# Patient Record
Sex: MALE | Race: WHITE | NOT HISPANIC OR LATINO | URBAN - NONMETROPOLITAN AREA
[De-identification: names, ages, dates, MRNs, and addresses within clinical notes are randomized per-mention and may not be internally consistent; named-entity substitution may affect disease eponyms.]

---

## 2018-01-23 ENCOUNTER — OFFICE VISIT (OUTPATIENT)
Dept: FAMILY MEDICINE CLINIC | Facility: CLINIC | Age: 43
End: 2018-01-23

## 2018-01-23 VITALS
SYSTOLIC BLOOD PRESSURE: 126 MMHG | DIASTOLIC BLOOD PRESSURE: 80 MMHG | HEIGHT: 67 IN | WEIGHT: 254.6 LBS | HEART RATE: 60 BPM | TEMPERATURE: 97.7 F | OXYGEN SATURATION: 98 % | BODY MASS INDEX: 39.96 KG/M2

## 2018-01-23 DIAGNOSIS — R06.2 WHEEZING: Primary | ICD-10-CM

## 2018-01-23 DIAGNOSIS — I10 BENIGN ESSENTIAL HYPERTENSION: ICD-10-CM

## 2018-01-23 DIAGNOSIS — R74.8 ELEVATED LIVER ENZYMES: ICD-10-CM

## 2018-01-23 PROCEDURE — 99203 OFFICE O/P NEW LOW 30 MIN: CPT | Performed by: FAMILY MEDICINE

## 2018-01-23 RX ORDER — ALBUTEROL SULFATE 90 UG/1
2 AEROSOL, METERED RESPIRATORY (INHALATION) EVERY 4 HOURS PRN
Qty: 1 INHALER | Refills: 0 | Status: SHIPPED | OUTPATIENT
Start: 2018-01-23 | End: 2019-01-24

## 2018-01-23 RX ORDER — CITALOPRAM 40 MG/1
40 TABLET ORAL
COMMUNITY
End: 2018-03-06

## 2018-01-23 RX ORDER — ALBUTEROL SULFATE 90 UG/1
2 AEROSOL, METERED RESPIRATORY (INHALATION) EVERY 4 HOURS PRN
Qty: 1 INHALER | Refills: 0 | Status: SHIPPED | OUTPATIENT
Start: 2018-01-23 | End: 2018-01-23 | Stop reason: SDUPTHER

## 2018-01-23 RX ORDER — CLONIDINE HYDROCHLORIDE 0.1 MG/1
0.1 TABLET ORAL
COMMUNITY
End: 2018-04-23 | Stop reason: SDUPTHER

## 2018-01-23 RX ORDER — LISINOPRIL 20 MG/1
20 TABLET ORAL
COMMUNITY
End: 2018-05-24 | Stop reason: SDUPTHER

## 2018-01-23 NOTE — PROGRESS NOTES
Subjective   Supa Kruse is a 42 y.o. male to discuss elevated liver enzymes and discuss his blood pressure and wheezing.       History of Present Illness     Benign essential hypertension-he has been treated for benign essential hypertension for approximately 5 years or so.  He is being treated with lisinopril 20 mg twice a day as well as clonidine 0.1 mg twice a day.  Overall his blood pressure tends to do well as long as he stays on his medicine.  No chest pain, shortness of breath, headache, lower extremity edema.  He says he needs some blood work for this.    Elevated liver enzymes-  he brings in some lab papers and I reviewed in front of him that were done in about July 2017.  I reviewed a CBC, CMP, thyroid, prostate level.  I noticed that the CMP is showing elevated liver enzymes and his AST at greater than 200 and ALT greater than 300.  He explains that he was drinking some during this time a few beers a day.  He said when this happen before he went into the thousands and his primary care told him to stop drinking altogether.  He notices a precipitous drop in his numbers.  At that point they chalked all this up to alcohol.  He was never tested for hepatitis.  He is unsure of his vaccine status for hepatitis.  He has not shown any scleral icterus, jaundice, abdominal pain that is chronic.  Also no abdominal distention.    Wheezing-lastly he says that he had the flu a few weeks ago and did not get anything for his treatment.  He is complaining that he is wheezing on the left side.  Endorsing occasional cough.  Would like something for the wheezing.    The following portions of the patient's history were reviewed and updated as appropriate: allergies, current medications, past family history, past medical history, past social history, past surgical history and problem list.    Review of Systems   Constitutional: Negative for activity change, appetite change, fatigue and fever.   HENT: Negative for ear pain,  facial swelling and sore throat.    Eyes: Negative for discharge and itching.   Respiratory: Positive for cough and wheezing. Negative for chest tightness and shortness of breath.    Cardiovascular: Negative for chest pain and palpitations.   Gastrointestinal: Negative for abdominal distention, abdominal pain, blood in stool, constipation, diarrhea, nausea, rectal pain and vomiting.   Musculoskeletal: Negative for arthralgias and back pain.   Neurological: Negative for weakness and numbness.   Psychiatric/Behavioral: Negative for decreased concentration and dysphoric mood. The patient is not nervous/anxious.        Objective   Physical Exam   Constitutional: He is oriented to person, place, and time. He appears well-developed and well-nourished. No distress.   HENT:   Mouth/Throat: Oropharynx is clear and moist. No oropharyngeal exudate.   Eyes: Conjunctivae are normal. Right eye exhibits no discharge. Left eye exhibits no discharge.   Neck: Normal range of motion. Neck supple.   Cardiovascular: Normal rate, regular rhythm, normal heart sounds and intact distal pulses.  Exam reveals no gallop and no friction rub.    No murmur heard.  Pulmonary/Chest: Effort normal and breath sounds normal. He has no wheezes. He has no rales.   Abdominal: Soft. Bowel sounds are normal. He exhibits no distension and no mass. There is no tenderness. There is no rebound and no guarding. No hernia.   Lymphadenopathy:     He has no cervical adenopathy.   Neurological: He is alert and oriented to person, place, and time.   Skin: Skin is warm and dry. No rash noted.   Psychiatric: He has a normal mood and affect. His behavior is normal.   Vitals reviewed.      Assessment/Plan   Supa was seen today for establish care.    Diagnoses and all orders for this visit:    Wheezing  -     albuterol (PROVENTIL HFA;VENTOLIN HFA) 108 (90 Base) MCG/ACT inhaler; Inhale 2 puffs Every 4 (Four) Hours As Needed for Wheezing.    Benign essential  hypertension  -     Comprehensive metabolic panel; Future  -     Comprehensive metabolic panel    Elevated liver enzymes  -     Comprehensive metabolic panel; Future  -     Hepatitis panel, acute; Future  -     Comprehensive metabolic panel  -     Hepatitis panel, acute    Other orders  -     Discontinue: albuterol (PROVENTIL HFA;VENTOLIN HFA) 108 (90 Base) MCG/ACT inhaler; Inhale 2 puffs Every 4 (Four) Hours As Needed for Wheezing.      We'll prescribe him some albuterol for the wheezing.  I'll get a CMP and hepatitis panel to hopefully shed light on the liver enzymes issue.

## 2018-01-23 NOTE — PATIENT INSTRUCTIONS
Liver Function Tests  Liver function tests are blood tests to see how well your liver is working. The proteins and enzymes measured in the test can alert your health care provider to inflammation, damage, or disease in your liver. It is common to have liver function tests:  · During annual physical exams.  · When you are taking certain medicines.  · If you have liver disease.  · If you drink a lot of alcohol.  · When you are not feeling well.  · When you have other conditions that may affect the liver.  Substances measured may include:  · Alanine transaminase (ALT). This is an enzyme in the liver.  · Aspartate transaminase (AST). This is an enzyme in the liver, heart, and muscles.  · Alkaline phosphatase (ALP). This is a protein in the liver, bile ducts, and bone. It is also in other body tissues.  · Total bilirubin. This is a yellow pigment in bile.  · Albumin. This is a protein in the liver.  · Prothrombin time and international normalized ratio (PT and INR). PT measures the time that it takes for your blood to clot. INR is a calculation of blood clotting time based upon your PT result. It is also calculated based on normal ranges defined by the laboratory that processed your lab test.  · Total protein. This measures two proteins, albumin and globulin, found in the blood.  How do I prepare for this test?  How you prepare will depend on which tests are being done and the reason why these tests are being done. You may need to:  · Avoid eating for 4-6 hours before the test or as directed by your health care provider.  · Stop taking certain medicines prior to your blood test as directed by your health care provider.  What do the results mean?  It is your responsibility to obtain your test results. Ask the lab or department performing the test when and how you will get your results. Contact your health care provider to discuss any questions you have about your results.  RANGE OF NORMAL VALUES   Ranges for normal values  may vary among different labs and hospitals. You should always check with your health care provider after having lab work or other tests done to discuss the meaning of your test results and whether your values are considered within normal limits.  The following are normal ranges for substances measured in liver function tests:  ALT  · Infant: may be twice as high as adult values.  · Child or adult: 4-36 international units/L at 37°C or 4-36 units/L (SI units).  · Elderly: may be slightly higher than adult values.  AST  ·  0-5 days old:  units/L.  · Child under 3 years old: 15-60 units/L.  · 3-6 years old: 15-50 units/L.  · 6-12 years old: 10-50 units/L.  · 12-18 years old: 10-40 units/L.  · Adult: 0-35 units/L or 0-0.58 microkatal/L (SI units).  · Elderly: slightly higher than adults.  ALP  · Child under 2 years old:  units/L.  · 2-8 years old:  units/L.  · 9-15 years old:  units/L.  · 16-21 years old:  units/L.  ·   · Elderly: slightly higher than adult.  Total bilirubin  · [place]: 1.0-12.0 mg/dL or 17.1-205 micromoles/L (SI units).  · Adult, elderly, or child: 0.3-1.0 mg/dL or 5.1-17 micromoles/L.  Albumin  · Premature infant: 3.0-4.2 g/dL.  · [place]: 3.5-5.4 g/dL.  · Infant: 4.4-5.4 g/dL.  · Child: 4.0-5.9 g/dL.  · Adult or elderly: 3.5-5.0 g/dL or 35-50 g/L (SI units).  PT  · 11.0-12.5 seconds; 85%-100%.  INR  · 0.8-1.1.  Total protein  · Premature infant: 4.2-7.6 g/dL.  · [place]: 4.6-7.4 g/dL.  · Infant: 6.0-6.7 g/dL.  · Child: 6.2-8.0 g/dL.  · Adult or elderly: 6.4-8.3 g/dL or 64-83 g/L (SI units).  MEANING OF RESULTS OUTSIDE NORMAL VALUE RANGES   Sometimes test results can be abnormal due to other factors, such as medicines, exercise, or pregnancy. Follow up with your health care provider if you have any questions about test results outside the normal value ranges.  ALT  · Levels above the normal range, along with other test results, may indicate liver  disease.  AST  · Levels above the normal range, along with other test results, may indicate liver disease. Sometimes levels also increase after burns, surgery, heart attack, muscle damage, or seizure.  ALP  · Levels above the normal range, along with other test results, may indicate biliary obstruction, diseases of the liver, bone disease, thyroid disease, tumors, fractures, leukemia or lymphoma, or several other conditions. People with blood type O or B may show higher levels after a fatty meal.  · Levels below the normal range, along with other test results, may indicate bone and teeth conditions, malnutrition, protein deficiency, or Marcio disease.  Total bilirubin  · Levels above the normal range, along with other test results, may indicate problems with the liver, gallbladder, or bile ducts.  Albumin  · Levels above the normal range, along with other test results, may indicate dehydration. They may also be caused by a diet that is high in protein. Sometimes, the band placed around the upper arm during the process of drawing blood can cause the level of this protein in your blood to rise and give you a result above the normal range.  · Levels below the normal range, along with other tests results, may indicate kidney disease, liver disease, or malabsorption of nutrients.  PT and INR  · Levels above the normal range mean your blood is clotting slower than normal. This may be due to blood disorders, liver disorders, or low levels of vitamin K.  Total protein  · Levels above the normal range, along with other test results, may be due to infection or other diseases.  · Levels below the normal range, along with other test results, may be due to an immune system disorder, bleeding, burns, kidney disorder, liver disease, trouble absorbing or getting enough nutrients, or other conditions that affect the intestines.  Talk with your health care provider to discuss your results, treatment options, and if necessary, the  need for more tests. Talk with your health care provider if you have any questions about your results.  This information is not intended to replace advice given to you by your health care provider. Make sure you discuss any questions you have with your health care provider.  Document Released: 01/20/2006 Document Revised: 08/23/2017 Document Reviewed: 04/23/2015  [employer] Interactive Patient Education © 2017 [employer]

## 2018-01-24 LAB
ALBUMIN SERPL-MCNC: 5 G/DL (ref 3.5–5.5)
ALBUMIN/GLOB SERPL: 1.9 {RATIO} (ref 1.2–2.2)
ALP SERPL-CCNC: 106 IU/L (ref 39–117)
ALT SERPL-CCNC: 14 IU/L (ref 0–44)
AST SERPL-CCNC: 14 IU/L (ref 0–40)
BILIRUB SERPL-MCNC: 0.8 MG/DL (ref 0–1.2)
BUN SERPL-MCNC: 15 MG/DL (ref 6–24)
BUN/CREAT SERPL: 17 (ref 9–20)
CALCIUM SERPL-MCNC: 10.2 MG/DL (ref 8.7–10.2)
CHLORIDE SERPL-SCNC: 97 MMOL/L (ref 96–106)
CO2 SERPL-SCNC: 22 MMOL/L (ref 18–29)
CREAT SERPL-MCNC: 0.89 MG/DL (ref 0.76–1.27)
GLOBULIN SER CALC-MCNC: 2.6 G/DL (ref 1.5–4.5)
GLUCOSE SERPL-MCNC: 76 MG/DL (ref 65–99)
HAV IGM SERPL QL IA: NEGATIVE
HBV CORE IGM SERPL QL IA: NEGATIVE
HBV SURFACE AG SERPL QL IA: NEGATIVE
HCV AB S/CO SERPL IA: 0.2 S/CO RATIO (ref 0–0.9)
POTASSIUM SERPL-SCNC: 4.2 MMOL/L (ref 3.5–5.2)
PROT SERPL-MCNC: 7.6 G/DL (ref 6–8.5)
SODIUM SERPL-SCNC: 141 MMOL/L (ref 134–144)

## 2018-01-29 ENCOUNTER — TELEPHONE (OUTPATIENT)
Dept: FAMILY MEDICINE CLINIC | Facility: CLINIC | Age: 43
End: 2018-01-29

## 2018-03-06 ENCOUNTER — OFFICE VISIT (OUTPATIENT)
Dept: FAMILY MEDICINE CLINIC | Facility: CLINIC | Age: 43
End: 2018-03-06

## 2018-03-06 VITALS
HEART RATE: 68 BPM | OXYGEN SATURATION: 98 % | HEIGHT: 67 IN | BODY MASS INDEX: 39.24 KG/M2 | RESPIRATION RATE: 16 BRPM | WEIGHT: 250 LBS | SYSTOLIC BLOOD PRESSURE: 130 MMHG | TEMPERATURE: 98.7 F | DIASTOLIC BLOOD PRESSURE: 72 MMHG

## 2018-03-06 DIAGNOSIS — B35.3 TINEA PEDIS OF BOTH FEET: Primary | ICD-10-CM

## 2018-03-06 PROCEDURE — 99213 OFFICE O/P EST LOW 20 MIN: CPT | Performed by: FAMILY MEDICINE

## 2018-03-06 RX ORDER — KETOCONAZOLE 20 MG/G
CREAM TOPICAL EVERY 12 HOURS SCHEDULED
Qty: 30 G | Refills: 1 | Status: SHIPPED | OUTPATIENT
Start: 2018-03-06 | End: 2018-04-11 | Stop reason: SDUPTHER

## 2018-03-06 NOTE — PROGRESS NOTES
Subjective   Supa Kruse is a 42 y.o. male. Presents today with possible athletes feet x 4 months.    History of Present Illness     Athlete's foot - Recurrent since January through March.  Tried all OTC products he can think of such as powders, ointments, and even the expensive stuff.  Changed shoes and treated shoes.  Describes as lateral toes with burning and itching.      The following portions of the patient's history were reviewed and updated as appropriate: allergies, current medications, past family history, past medical history, past social history, past surgical history and problem list.    Review of Systems   Constitutional: Negative for activity change and appetite change.   Skin: Positive for rash. Negative for color change, pallor and wound.        Itchy feet  Burning feet       Objective   Physical Exam   Constitutional: He appears well-developed and well-nourished. No distress.   Cardiovascular: Normal rate, regular rhythm and normal heart sounds.    Pulmonary/Chest: Effort normal and breath sounds normal. No respiratory distress.   Skin: Skin is warm and dry. He is not diaphoretic.   Mildly erythematous crusty rash bilateral feet       Assessment/Plan   Supa was seen today for recurrent skin infections.    Diagnoses and all orders for this visit:    Tinea pedis of both feet  -     ketoconazole (NIZORAL) 2 % cream; Apply  topically Every 12 (Twelve) Hours.      Also added aluminum acetate soaks twice daily for 7 days.  He is to do the ketoconazole for at least 2-6 weeks so he should do the cream until the rash is gone and then for 2 weeks afterwards.  If this does not improve his symptoms, we'll consider referral to podiatry.

## 2018-03-06 NOTE — PATIENT INSTRUCTIONS
Athlete's Foot  Athlete's foot (tinea pedis) is a fungal infection of the skin on the feet. It often occurs on the skin that is between or underneath the toes. It can also occur on the soles of the feet. The infection can spread from person to person (is contagious).  Follow these instructions at home:  · Apply or take over-the-counter and prescription medicines only as told by your doctor.  · Keep all follow-up visits as told by your doctor. This is important.  · Do not scratch your feet.  · Keep your feet dry:  ¨ Wear cotton or wool socks. Change your socks every day or if they become wet.  ¨ Wear shoes that allow air to move around, such as sandals or canvas tennis shoes.  · Wash and dry your feet:  ¨ Every day or as told by your doctor.  ¨ After exercising.  ¨ Including the area between your toes.  · Wear sandals in wet areas, such as locker rooms and shared showers.  · Do not share any of these items:  ¨ Towels.  ¨ Nail clippers.  ¨ Other personal items that touch your feet.  · If you have diabetes, keep your blood sugar under control.  Contact a doctor if:  · You have a fever.  · You have swelling, soreness, warmth, or redness in your foot.  · You are not getting better with treatment.  · Your symptoms get worse.  · You have new symptoms.  This information is not intended to replace advice given to you by your health care provider. Make sure you discuss any questions you have with your health care provider.  Document Released: 06/05/2009 Document Revised: 05/25/2017 Document Reviewed: 06/20/2016  [employer] Interactive Patient Education © 2017 [employer]

## 2018-04-11 DIAGNOSIS — B35.3 TINEA PEDIS OF BOTH FEET: ICD-10-CM

## 2018-04-11 RX ORDER — KETOCONAZOLE 20 MG/G
CREAM TOPICAL
Qty: 30 G | Refills: 0 | Status: SHIPPED | OUTPATIENT
Start: 2018-04-11 | End: 2018-05-14 | Stop reason: SDUPTHER

## 2018-04-23 RX ORDER — CLONIDINE HYDROCHLORIDE 0.1 MG/1
TABLET ORAL
Qty: 60 TABLET | Refills: 0 | Status: SHIPPED | OUTPATIENT
Start: 2018-04-23 | End: 2018-05-24 | Stop reason: SDUPTHER

## 2018-05-11 DIAGNOSIS — B35.3 TINEA PEDIS OF BOTH FEET: ICD-10-CM

## 2018-05-14 RX ORDER — KETOCONAZOLE 20 MG/G
CREAM TOPICAL
Qty: 30 G | Refills: 0 | Status: SHIPPED | OUTPATIENT
Start: 2018-05-14 | End: 2018-08-29 | Stop reason: SDUPTHER

## 2018-05-24 RX ORDER — LISINOPRIL 20 MG/1
TABLET ORAL
Qty: 60 TABLET | Refills: 5 | Status: SHIPPED | OUTPATIENT
Start: 2018-05-24 | End: 2018-12-26 | Stop reason: SDUPTHER

## 2018-05-24 RX ORDER — CLONIDINE HYDROCHLORIDE 0.1 MG/1
TABLET ORAL
Qty: 60 TABLET | Refills: 5 | Status: SHIPPED | OUTPATIENT
Start: 2018-05-24 | End: 2018-12-26 | Stop reason: SDUPTHER

## 2018-08-29 DIAGNOSIS — B35.3 TINEA PEDIS OF BOTH FEET: ICD-10-CM

## 2018-08-29 RX ORDER — KETOCONAZOLE 20 MG/G
CREAM TOPICAL
Qty: 30 G | Refills: 5 | Status: SHIPPED | OUTPATIENT
Start: 2018-08-29 | End: 2019-01-24

## 2018-12-20 ENCOUNTER — OFFICE VISIT (OUTPATIENT)
Dept: FAMILY MEDICINE CLINIC | Facility: CLINIC | Age: 43
End: 2018-12-20

## 2018-12-20 VITALS
RESPIRATION RATE: 16 BRPM | DIASTOLIC BLOOD PRESSURE: 72 MMHG | OXYGEN SATURATION: 99 % | WEIGHT: 229.6 LBS | BODY MASS INDEX: 36.03 KG/M2 | HEART RATE: 80 BPM | TEMPERATURE: 97.8 F | SYSTOLIC BLOOD PRESSURE: 130 MMHG | HEIGHT: 67 IN

## 2018-12-20 DIAGNOSIS — I10 BENIGN ESSENTIAL HYPERTENSION: Primary | ICD-10-CM

## 2018-12-20 DIAGNOSIS — Z13.220 SCREENING FOR LIPOID DISORDERS: ICD-10-CM

## 2018-12-20 DIAGNOSIS — Z79.899 HIGH RISK MEDICATION USE: ICD-10-CM

## 2018-12-20 DIAGNOSIS — Z23 NEED FOR INFLUENZA VACCINATION: ICD-10-CM

## 2018-12-20 LAB
ALBUMIN SERPL-MCNC: 4.7 G/DL (ref 3.5–5.2)
ALBUMIN/GLOB SERPL: 1.8 G/DL
ALP SERPL-CCNC: 108 U/L (ref 39–117)
ALT SERPL-CCNC: 13 U/L (ref 1–41)
AST SERPL-CCNC: 16 U/L (ref 1–40)
BILIRUB SERPL-MCNC: 0.5 MG/DL (ref 0.1–1.2)
BUN SERPL-MCNC: 16 MG/DL (ref 6–20)
BUN/CREAT SERPL: 20.8 (ref 7–25)
CALCIUM SERPL-MCNC: 10.1 MG/DL (ref 8.6–10.5)
CHLORIDE SERPL-SCNC: 99 MMOL/L (ref 98–107)
CHOLEST SERPL-MCNC: 177 MG/DL (ref 0–200)
CO2 SERPL-SCNC: 26.6 MMOL/L (ref 22–29)
CREAT SERPL-MCNC: 0.77 MG/DL (ref 0.76–1.27)
GLOBULIN SER CALC-MCNC: 2.6 GM/DL
GLUCOSE SERPL-MCNC: 96 MG/DL (ref 65–99)
HDLC SERPL-MCNC: 34 MG/DL (ref 40–60)
LDLC SERPL CALC-MCNC: 122 MG/DL (ref 0–100)
POTASSIUM SERPL-SCNC: 4.3 MMOL/L (ref 3.5–5.2)
PROT SERPL-MCNC: 7.3 G/DL (ref 6–8.5)
SODIUM SERPL-SCNC: 137 MMOL/L (ref 136–145)
TRIGL SERPL-MCNC: 103 MG/DL (ref 0–150)
VLDLC SERPL CALC-MCNC: 20.6 MG/DL (ref 5–40)

## 2018-12-20 PROCEDURE — 90471 IMMUNIZATION ADMIN: CPT | Performed by: FAMILY MEDICINE

## 2018-12-20 PROCEDURE — 99213 OFFICE O/P EST LOW 20 MIN: CPT | Performed by: FAMILY MEDICINE

## 2018-12-20 PROCEDURE — 90674 CCIIV4 VAC NO PRSV 0.5 ML IM: CPT | Performed by: FAMILY MEDICINE

## 2018-12-20 NOTE — PATIENT INSTRUCTIONS
Hypertension  Hypertension is another name for high blood pressure. High blood pressure forces your heart to work harder to pump blood. This can cause problems over time.  There are two numbers in a blood pressure reading. There is a top number (systolic) over a bottom number (diastolic). It is best to have a blood pressure below 120/80. Healthy choices can help lower your blood pressure. You may need medicine to help lower your blood pressure if:  · Your blood pressure cannot be lowered with healthy choices.  · Your blood pressure is higher than 130/80.    Follow these instructions at home:  Eating and drinking  · If directed, follow the DASH eating plan. This diet includes:  ? Filling half of your plate at each meal with fruits and vegetables.  ? Filling one quarter of your plate at each meal with whole grains. Whole grains include whole wheat pasta, brown rice, and whole grain bread.  ? Eating or drinking low-fat dairy products, such as skim milk or low-fat yogurt.  ? Filling one quarter of your plate at each meal with low-fat (lean) proteins. Low-fat proteins include fish, skinless chicken, eggs, beans, and tofu.  ? Avoiding fatty meat, cured and processed meat, or chicken with skin.  ? Avoiding premade or processed food.  · Eat less than 1,500 mg of salt (sodium) a day.  · Limit alcohol use to no more than 1 drink a day for nonpregnant women and 2 drinks a day for men. One drink equals 12 oz of beer, 5 oz of wine, or 1½ oz of hard liquor.  Lifestyle  · Work with your doctor to stay at a healthy weight or to lose weight. Ask your doctor what the best weight is for you.  · Get at least 30 minutes of exercise that causes your heart to beat faster (aerobic exercise) most days of the week. This may include walking, swimming, or biking.  · Get at least 30 minutes of exercise that strengthens your muscles (resistance exercise) at least 3 days a week. This may include lifting weights or pilates.  · Do not use any  products that contain nicotine or tobacco. This includes cigarettes and e-cigarettes. If you need help quitting, ask your doctor.  · Check your blood pressure at home as told by your doctor.  · Keep all follow-up visits as told by your doctor. This is important.  Medicines  · Take over-the-counter and prescription medicines only as told by your doctor. Follow directions carefully.  · Do not skip doses of blood pressure medicine. The medicine does not work as well if you skip doses. Skipping doses also puts you at risk for problems.  · Ask your doctor about side effects or reactions to medicines that you should watch for.  Contact a doctor if:  · You think you are having a reaction to the medicine you are taking.  · You have headaches that keep coming back (recurring).  · You feel dizzy.  · You have swelling in your ankles.  · You have trouble with your vision.  Get help right away if:  · You get a very bad headache.  · You start to feel confused.  · You feel weak or numb.  · You feel faint.  · You get very bad pain in your:  ? Chest.  ? Belly (abdomen).  · You throw up (vomit) more than once.  · You have trouble breathing.  Summary  · Hypertension is another name for high blood pressure.  · Making healthy choices can help lower blood pressure. If your blood pressure cannot be controlled with healthy choices, you may need to take medicine.  This information is not intended to replace advice given to you by your health care provider. Make sure you discuss any questions you have with your health care provider.  Document Released: 06/05/2009 Document Revised: 11/15/2017 Document Reviewed: 11/15/2017  [employer] Interactive Patient Education © 2018 [employer]

## 2018-12-20 NOTE — PROGRESS NOTES
Subjective   Supa Kruse is a 42 y.o. male. Presents today for medication management for hypertension.     History of Present Illness     Hypertension - Overall it has been doing very well.  He has been working out multiple times a week and changed much of the foods he was eating.  He is now trying to take care of himself and no longer drinking alcohol.  Taking the lisinopril 20mg BID and clonidine 0.1mg BID as prescribed.  He would like to slowly come off of BP meds as tolerated.  No chest pain, SOB, headache, or lower extremity edema.    The following portions of the patient's history were reviewed and updated as appropriate: allergies, current medications, past family history, past medical history, past social history, past surgical history and problem list.    Review of Systems   Constitutional: Negative for activity change, appetite change, fatigue and fever.   Respiratory: Negative for chest tightness and shortness of breath.    Cardiovascular: Negative for chest pain and palpitations.       Objective   Physical Exam   Constitutional: He is oriented to person, place, and time. He appears well-developed and well-nourished. No distress.   HENT:   Mouth/Throat: Oropharynx is clear and moist. No oropharyngeal exudate.   Eyes: Conjunctivae are normal. Right eye exhibits no discharge. Left eye exhibits no discharge.   Neck: Normal range of motion. Neck supple.   Cardiovascular: Normal rate, regular rhythm and normal heart sounds. Exam reveals no gallop and no friction rub.   No murmur heard.  Pulmonary/Chest: Effort normal and breath sounds normal. He has no wheezes. He has no rales.   Abdominal: Soft. Bowel sounds are normal. He exhibits no distension. There is no tenderness.   Musculoskeletal: He exhibits no edema or deformity.   Lymphadenopathy:     He has no cervical adenopathy.   Neurological: He is alert and oriented to person, place, and time.   Skin: Skin is warm and dry. No rash noted.   Psychiatric: He  has a normal mood and affect. His behavior is normal.   Nursing note and vitals reviewed.      Assessment/Plan   Supa was seen today for med management.    Diagnoses and all orders for this visit:    Benign essential hypertension  -     Comprehensive Metabolic Panel    Need for influenza vaccination  -     Flucelvax Quad=>4Years ([id number])    Screening for lipoid disorders  -     Lipid panel    High risk medication use  -     Comprehensive Metabolic Panel  -     Lipid panel      Will get tests as above.  Will try to taper lisinopril over a few weeks and have patient call in blood pressures that are above 140/90.  If going well will consider tapering clonidine.

## 2018-12-26 RX ORDER — LISINOPRIL 20 MG/1
TABLET ORAL
Qty: 60 TABLET | Refills: 2 | Status: SHIPPED | OUTPATIENT
Start: 2018-12-26 | End: 2019-04-02 | Stop reason: SDUPTHER

## 2018-12-26 RX ORDER — CLONIDINE HYDROCHLORIDE 0.1 MG/1
TABLET ORAL
Qty: 60 TABLET | Refills: 2 | Status: SHIPPED | OUTPATIENT
Start: 2018-12-26 | End: 2019-04-02 | Stop reason: SDUPTHER

## 2019-01-24 ENCOUNTER — OFFICE VISIT (OUTPATIENT)
Dept: FAMILY MEDICINE CLINIC | Facility: CLINIC | Age: 44
End: 2019-01-24

## 2019-01-24 VITALS
DIASTOLIC BLOOD PRESSURE: 64 MMHG | BODY MASS INDEX: 36.32 KG/M2 | HEART RATE: 56 BPM | OXYGEN SATURATION: 98 % | WEIGHT: 231.4 LBS | SYSTOLIC BLOOD PRESSURE: 124 MMHG | TEMPERATURE: 97.9 F | RESPIRATION RATE: 16 BRPM | HEIGHT: 67 IN

## 2019-01-24 DIAGNOSIS — F40.9 INSOMNIA DUE TO ANXIETY AND FEAR: Primary | ICD-10-CM

## 2019-01-24 DIAGNOSIS — F51.05 INSOMNIA DUE TO ANXIETY AND FEAR: Primary | ICD-10-CM

## 2019-01-24 DIAGNOSIS — F43.21 COMPLICATED BEREAVEMENT: ICD-10-CM

## 2019-01-24 PROCEDURE — 99213 OFFICE O/P EST LOW 20 MIN: CPT | Performed by: FAMILY MEDICINE

## 2019-01-24 RX ORDER — TRAZODONE HYDROCHLORIDE 50 MG/1
TABLET ORAL
Qty: 45 TABLET | Refills: 1 | Status: SHIPPED | OUTPATIENT
Start: 2019-01-24 | End: 2019-03-25

## 2019-01-24 NOTE — PATIENT INSTRUCTIONS
Generalized Anxiety Disorder, Adult  Generalized anxiety disorder (MADELYN) is a mental health disorder. People with this condition constantly worry about everyday events. Unlike normal anxiety, worry related to MADELYN is not triggered by a specific event. These worries also do not fade or get better with time. MADELYN interferes with life functions, including relationships, work, and school.  MADELYN can vary from mild to severe. People with severe MADELYN can have intense waves of anxiety with physical symptoms (panic attacks).  What are the causes?  The exact cause of MADELYN is not known.  What increases the risk?  This condition is more likely to develop in:  · Women.  · People who have a family history of anxiety disorders.  · People who are very shy.  · People who experience very stressful life events, such as the death of a loved one.  · People who have a very stressful family environment.    What are the signs or symptoms?  People with MADLEYN often worry excessively about many things in their lives, such as their health and family. They may also be overly concerned about:  · Doing well at work.  · Being on time.  · Natural disasters.  · Friendships.    Physical symptoms of MADELYN include:  · Fatigue.  · Muscle tension or having muscle twitches.  · Trembling or feeling shaky.  · Being easily startled.  · Feeling like your heart is pounding or racing.  · Feeling out of breath or like you cannot take a deep breath.  · Having trouble falling asleep or staying asleep.  · Sweating.  · Nausea, diarrhea, or irritable bowel syndrome (IBS).  · Headaches.  · Trouble concentrating or remembering facts.  · Restlessness.  · Irritability.    How is this diagnosed?  Your health care provider can diagnose MADELYN based on your symptoms and medical history. You will also have a physical exam. The health care provider will ask specific questions about your symptoms, including how severe they are, when they started, and if they come and go. Your health care  provider may ask you about your use of alcohol or drugs, including prescription medicines. Your health care provider may refer you to a mental health specialist for further evaluation.  Your health care provider will do a thorough examination and may perform additional tests to rule out other possible causes of your symptoms.  To be diagnosed with MADELYN, a person must have anxiety that:  · Is out of his or her control.  · Affects several different aspects of his or her life, such as work and relationships.  · Causes distress that makes him or her unable to take part in normal activities.  · Includes at least three physical symptoms of MADELYN, such as restlessness, fatigue, trouble concentrating, irritability, muscle tension, or sleep problems.    Before your health care provider can confirm a diagnosis of MADELYN, these symptoms must be present more days than they are not, and they must last for six months or longer.  How is this treated?  The following therapies are usually used to treat MADELYN:  · Medicine. Antidepressant medicine is usually prescribed for long-term daily control. Antianxiety medicines may be added in severe cases, especially when panic attacks occur.  · Talk therapy (psychotherapy). Certain types of talk therapy can be helpful in treating MADELYN by providing support, education, and guidance. Options include:  ? Cognitive behavioral therapy (CBT). People learn coping skills and techniques to ease their anxiety. They learn to identify unrealistic or negative thoughts and behaviors and to replace them with positive ones.  ? Acceptance and commitment therapy (ACT). This treatment teaches people how to be mindful as a way to cope with unwanted thoughts and feelings.  ? Biofeedback. This process trains you to manage your body's response (physiological response) through breathing techniques and relaxation methods. You will work with a therapist while machines are used to monitor your physical symptoms.  · Stress  management techniques. These include yoga, meditation, and exercise.    A mental health specialist can help determine which treatment is best for you. Some people see improvement with one type of therapy. However, other people require a combination of therapies.  Follow these instructions at home:  · Take over-the-counter and prescription medicines only as told by your health care provider.  · Try to maintain a normal routine.  · Try to anticipate stressful situations and allow extra time to manage them.  · Practice any stress management or self-calming techniques as taught by your health care provider.  · Do not punish yourself for setbacks or for not making progress.  · Try to recognize your accomplishments, even if they are small.  · Keep all follow-up visits as told by your health care provider. This is important.  Contact a health care provider if:  · Your symptoms do not get better.  · Your symptoms get worse.  · You have signs of depression, such as:  ? A persistently sad, cranky, or irritable mood.  ? Loss of enjoyment in activities that used to bring you neris.  ? Change in weight or eating.  ? Changes in sleeping habits.  ? Avoiding friends or family members.  ? Loss of energy for normal tasks.  ? Feelings of guilt or worthlessness.  Get help right away if:  · You have serious thoughts about hurting yourself or others.  If you ever feel like you may hurt yourself or others, or have thoughts about taking your own life, get help right away. You can go to your nearest emergency department or call:  · Your local emergency services (911 in the [place]  · A suicide crisis helpline, such as the National Suicide Prevention Lifeline at 1-462.275.8873. This is open 24 hours a day.    Summary  · Generalized anxiety disorder (MADELYN) is a mental health disorder that involves worry that is not triggered by a specific event.  · People with MADELYN often worry excessively about many things in their lives, such as their health and  family.  · MADELYN may cause physical symptoms such as restlessness, trouble concentrating, sleep problems, frequent sweating, nausea, diarrhea, headaches, and trembling or muscle twitching.  · A mental health specialist can help determine which treatment is best for you. Some people see improvement with one type of therapy. However, other people require a combination of therapies.  This information is not intended to replace advice given to you by your health care provider. Make sure you discuss any questions you have with your health care provider.  Document Released: 04/14/2014 Document Revised: 11/07/2017 Document Reviewed: 11/07/2017  [employer] Interactive Patient Education © 2018 [employer]

## 2019-01-24 NOTE — PROGRESS NOTES
Subjective   Supa Kruse is a 43 y.o. male. Patient is being evaluated today for anxiety and stress.   History of Present Illness     {Common H&P Review Areas:19588}    Review of Systems   Psychiatric/Behavioral: The patient is nervous/anxious.         Stress.    All other systems reviewed and are negative.      Objective   Physical Exam      Assessment/Plan   {Assess/PlanSmartLinks:39647}

## 2019-01-24 NOTE — PROGRESS NOTES
Subjective   Supa Kruse is a 43 y.o. male present today to discuss anxiety and stress.     History of Present Illness     Anxiety - He has been reflecting recently on people in his life in that he is fearing losing them to either death or moving on.  He admits that this is irrational.  His wife  9 years ago and hx of SSRI use which he did not like.  Hx of alcohol abuse but not using it much anymore.  He is also concerned that at some point his son is going to move out of the house and he will not have any body left to care for.  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    The following portions of the patient's history were reviewed and updated as appropriate: allergies, current medications, past family history, past medical history, past social history, past surgical history and problem list.    Review of Systems   Psychiatric/Behavioral: The patient is nervous/anxious.         Stress.        Objective   Physical Exam   Constitutional: He appears well-developed and well-nourished. No distress.   Skin: He is not diaphoretic.   Psychiatric: His speech is normal and behavior is normal. Judgment and thought content normal. His mood appears anxious. Cognition and memory are normal. He is attentive.   Nursing note and vitals reviewed.      Assessment/Plan   Supa was seen today for anxiety and stress.    Diagnoses and all orders for this visit:    Insomnia due to anxiety and fear  -     traZODone (DESYREL) 50 MG tablet; Take 1 tablet at bedtime as needed for sleep.  May repeat 1 dose in 2 hours if still awake  -     Ambulatory Referral to Psychiatry    Complicated bereavement  -     Ambulatory Referral to Psychiatry      Plan as above.  Will start trazodone tonight for sleep.

## 2019-01-30 ENCOUNTER — TELEPHONE (OUTPATIENT)
Dept: FAMILY MEDICINE CLINIC | Facility: CLINIC | Age: 44
End: 2019-01-30

## 2019-01-30 NOTE — TELEPHONE ENCOUNTER
Patient called stating he has been having worsening anxiety/depression since his last office visit on 1/24/2019. He stated the trazodone that was prescribed does help him get to sleep but he only stays asleep for 2-3 hours at a time. He thinks he really needs to be put on an antidepressant. Do you want him to come back in for another office visit to discuss this?     Also, he mentioned to me about worsening tinea pedis of his feet and that he is out of his medications. I told him to have the pharmacy send us a refill request on his medications.     Please advise, thanks.

## 2019-02-04 ENCOUNTER — OFFICE VISIT (OUTPATIENT)
Dept: FAMILY MEDICINE CLINIC | Facility: CLINIC | Age: 44
End: 2019-02-04

## 2019-02-04 VITALS
BODY MASS INDEX: 35.94 KG/M2 | HEART RATE: 80 BPM | RESPIRATION RATE: 16 BRPM | HEIGHT: 67 IN | DIASTOLIC BLOOD PRESSURE: 60 MMHG | TEMPERATURE: 97.8 F | WEIGHT: 229 LBS | OXYGEN SATURATION: 99 % | SYSTOLIC BLOOD PRESSURE: 110 MMHG

## 2019-02-04 DIAGNOSIS — F39 MOOD DISORDER (HCC): Primary | ICD-10-CM

## 2019-02-04 PROCEDURE — 99214 OFFICE O/P EST MOD 30 MIN: CPT | Performed by: FAMILY MEDICINE

## 2019-02-04 RX ORDER — DIVALPROEX SODIUM 500 MG/1
500 TABLET, EXTENDED RELEASE ORAL DAILY
Qty: 30 TABLET | Refills: 1 | Status: SHIPPED | OUTPATIENT
Start: 2019-02-04 | End: 2019-03-06

## 2019-02-04 NOTE — PROGRESS NOTES
Subjective   Supa Kruse is a 43 y.o. male. Presents today for follow up on anxiety/depression.    History of Present Illness     Patient was seen fairly recently for anxiety and depression and in the end he did not want take any antidepressants and settled on some trazodone for insomnia.  Since then his anxiety and depression have spiraled and he has also gotten worse sleep.  He says he waking up multiple times at night.  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  His family history includes schizophrenia as well as bipolar disorder.  He himself has a history of medicating with alcohol and benzos.  He says he is here today because his family continuously worries about him and thinks he should be on something every day.  He talks about feeling like he doesn't want to be around anymore but he has no active suicidal ideation as he cites that his son and father basically keep him alive.    The following portions of the patient's history were reviewed and updated as appropriate: allergies, current medications, past family history, past medical history, past social history, past surgical history and problem list.    Review of Systems   Psychiatric/Behavioral: Positive for decreased concentration, dysphoric mood and sleep disturbance. Negative for agitation, behavioral problems, confusion, hallucinations and self-injury. The patient is nervous/anxious. The patient is not hyperactive.        Objective   Physical Exam   Constitutional: He appears well-developed and well-nourished. No distress.   Skin: He is not diaphoretic.   Psychiatric: His behavior is normal. Judgment and thought content normal. His mood appears anxious. His speech is rapid and/or pressured. Cognition and memory are normal. He exhibits a depressed mood. He is inattentive.   Nursing note and vitals reviewed.      Assessment/Plan   Supa was seen today for  follow-up.    Diagnoses and all orders for this visit:    Mood disorder (CMS/[facility])  -     NeuroPsych Testing; Future  -     divalproex (DEPAKOTE ER) 500 MG 24 hr tablet; Take 1 tablet by mouth Daily.     I'm concerned with some of the behavior he has been exhibiting lately.  He is showing some signs of a possible bipolar disorder type II diagnosis which would change the treatment design drastically.  I am going to get him a neuropsych test as he does not have the time or funds for a partial hospitalization program such as that [facility].  While already on the test come back am going to get him started on Depakote.  If it turns out that he does not support a diagnosis of bipolar disorder type II Depakote will be fairly easy to get him switched onto something more appropriate.  But in the meantime Depakote should prevent his mood from being too unstable.  As always he was given suicide prevention tips and asked to call us or 911 if he feels active suicidal thoughts coming.    Spent >30 minutes in the room with > 50 % of the time doing face-toface counseling as above and about the diagnosis of bipolar disorder.

## 2019-02-04 NOTE — PATIENT INSTRUCTIONS
Neuropsychological Examination  A neuropsychological examination is a series of tests related to thinking and behavior. It is done to see how the brain works. The exam may be done on people who have had a brain injury or trauma or who have a disease or disorder that affects the brain. The results will help your health care provider diagnose any problems and put together a helpful treatment or assistance plan.  Tell a health care provider about:  · Any allergies you have.  · Your sports and activities.  · Recent injuries or surgery.  · All medicines you are taking, including vitamins, herbs, eye drops, creams, and over-the-counter medicines.  · Any medical conditions you have.  · Any other issues or behaviors that might affect your ability to take the exam.  What happens before the procedure?  · Ask your health care provider about how medicines you are taking may affect the exam results.  · Prepare a list of current medicines to bring to the exam.  · Ask a family member or friend to come to the test if you have trouble remembering family history or any other details.  · Prepare any records of previous neurological testing, including CT or MRI scans, to bring to the exam.  · Get a good night’s rest before the exam.  · Do not drink alcohol 24 hours before the exam.  · Let the examiner know about any other issues or behaviors that might affect your ability to take the exam.  What happens during the procedure?  The test is usually performed at an outpatient clinic. You will sit at a table for most of the exam. It can also be done bedside in a hospital setting, if necessary. During the exam, you will answer questions, make choices, read, write, and complete simple physical tasks. You might also use a computer-guided lesson. There is no pain, needles, or electrodes. A trained examiner will be there.  There are several areas, or modules, to the exam. The examiner will administer the ones that most closely relate to the  reason for your referral and the disease or symptoms. The examination may take several hours depending on the areas being tested.  What happens after the procedure?  · You may go home after the exam.  · Follow up with your health care provider for the results.  This information is not intended to replace advice given to you by your health care provider. Make sure you discuss any questions you have with your health care provider.  Document Released: 07/15/2015 Document Revised: 07/07/2017 Document Reviewed: 02/06/2015  [employer] Interactive Patient Education © 2018 [employer]

## 2019-03-06 ENCOUNTER — OFFICE VISIT (OUTPATIENT)
Dept: FAMILY MEDICINE CLINIC | Facility: CLINIC | Age: 44
End: 2019-03-06

## 2019-03-06 VITALS
TEMPERATURE: 98.2 F | HEIGHT: 67 IN | BODY MASS INDEX: 35.5 KG/M2 | WEIGHT: 226.2 LBS | DIASTOLIC BLOOD PRESSURE: 76 MMHG | OXYGEN SATURATION: 99 % | RESPIRATION RATE: 16 BRPM | HEART RATE: 61 BPM | SYSTOLIC BLOOD PRESSURE: 126 MMHG

## 2019-03-06 DIAGNOSIS — Z79.899 HIGH RISK MEDICATION USE: ICD-10-CM

## 2019-03-06 DIAGNOSIS — F39 MOOD DISORDER (HCC): Primary | ICD-10-CM

## 2019-03-06 PROCEDURE — 99213 OFFICE O/P EST LOW 20 MIN: CPT | Performed by: FAMILY MEDICINE

## 2019-03-06 RX ORDER — DIVALPROEX SODIUM 500 MG/1
1000 TABLET, EXTENDED RELEASE ORAL DAILY
Qty: 60 TABLET | Refills: 3 | Status: SHIPPED | OUTPATIENT
Start: 2019-03-06 | End: 2019-10-03

## 2019-03-06 NOTE — PROGRESS NOTES
Subjective   Supa Kruse is a 43 y.o. male. Presents today for one month follow up on mood disorder.     History of Present Illness     Mood disorder - uncontrolled.  Patient is on the depakote ER 500mg daily.  He says he is not feeling much different since starting it.  PHQ-9 completed today and was 16.  He was confused about when he was sent for the neuropsych test why they were trying to give him therapy.  He was also contacted by someone from [facility] but was also confused so he did not go in for the appointment.  It was explained to him that that was the neuropsych test and the other one was the psychiatry appointment.  He expresses some frustration about why he cannot just be put back on SSRIs and I explained that he had told me previously that the SSRIs did not work but he claims that the SSRIs did work he does then want to be on them anymore.  He initially says that he just has anxiety and depression but then admits that his family does have some history that could be bipolar and he also possibly has PTSD.    The following portions of the patient's history were reviewed and updated as appropriate: allergies, current medications, past family history, past medical history, past social history, past surgical history and problem list.    Review of Systems   Psychiatric/Behavioral: Positive for dysphoric mood. The patient is nervous/anxious.        Objective   Physical Exam   Constitutional: No distress.   Skin: He is not diaphoretic.   Psychiatric: His behavior is normal. Judgment and thought content normal. His mood appears anxious. His speech is rapid and/or pressured. Cognition and memory are normal. He is attentive.   Nursing note and vitals reviewed.      Assessment/Plan   Supa was seen today for follow-up.    Diagnoses and all orders for this visit:    Mood disorder (CMS/[facility])  -     Comprehensive Metabolic Panel  -     Valproic Acid Level, Total  -     divalproex (DEPAKOTE ER) 500 MG 24 hr tablet;  Take 2 tablets by mouth Daily.    High risk medication use  -     Valproic Acid Level, Total      I re-explained to him that I either need a neuropsych test to help with the evaluation so I can treat him better or he needs to get set up with psychiatry.  I do not think this is a clear case of depression and anxiety.  I think there is more to it and I am having a lot of difficulty getting to the bottom of it.  He decided he is going to contact [facility] and make an appointment with psychiatry.  In the meantime I am going to increase his Depakote to 1000 mg/day and get a level as well as a CMP.  At the end of the appointment he did admit to me that he has been drinking some lately but is not being forthcoming with the quantity.

## 2019-03-06 NOTE — PATIENT INSTRUCTIONS
Living With Depression  Everyone experiences occasional disappointment, sadness, and loss in their lives. When you are feeling down, blue, or sad for at least 2 weeks in a row, it may mean that you have depression. Depression can affect your thoughts and feelings, relationships, daily activities, and physical health. It is caused by changes in the way your brain functions. If you receive a diagnosis of depression, your health care provider will tell you which type of depression you have and what treatment options are available to you.  If you are living with depression, there are ways to help you recover from it and also ways to prevent it from coming back.  How to cope with lifestyle changes  Coping with stress  Stress is your body’s reaction to life changes and events, both good and bad. Stressful situations may include:  · Getting .  · The death of a spouse.  · Losing a job.  · Retiring.  · Having a baby.    Stress can last just a few hours or it can be ongoing. Stress can play a major role in depression, so it is important to learn both how to cope with stress and how to think about it differently.  Talk with your health care provider or a counselor if you would like to learn more about stress reduction. He or she may suggest some stress reduction techniques, such as:  · Music therapy. This can include creating music or listening to music. Choose music that you enjoy and that inspires you.  · Mindfulness-based meditation. This kind of meditation can be done while sitting or walking. It involves being aware of your normal breaths, rather than trying to control your breathing.  · Centering prayer. This is a kind of meditation that involves focusing on a spiritual word or phrase. Choose a word, phrase, or sacred image that is meaningful to you and that brings you peace.  · Deep breathing. To do this, expand your stomach and inhale slowly through your nose. Hold your breath for 3-5 seconds, then exhale  slowly, allowing your stomach muscles to relax.  · Muscle relaxation. This involves intentionally tensing muscles then relaxing them.    Choose a stress reduction technique that fits your lifestyle and personality. Stress reduction techniques take time and practice to develop. Set aside 5-15 minutes a day to do them. Therapists can offer training in these techniques. The training may be covered by some insurance plans. Other things you can do to manage stress include:  · Keeping a stress diary. This can help you learn what triggers your stress and ways to control your response.  · Understanding what your limits are and saying no to requests or events that lead to a schedule that is too full.  · Thinking about how you respond to certain situations. You may not be able to control everything, but you can control how you react.  · Adding humor to your life by watching funny films or TV shows.  · Making time for activities that help you relax and not feeling guilty about spending your time this way.    Medicines  Your health care provider may suggest certain medicines if he or she feels that they will help improve your condition. Avoid using alcohol and other substances that may prevent your medicines from working properly (may interact). It is also important to:  · Talk with your pharmacist or health care provider about all the medicines that you take, their possible side effects, and what medicines are safe to take together.  · Make it your goal to take part in all treatment decisions (shared decision-making). This includes giving input on the side effects of medicines. It is best if shared decision-making with your health care provider is part of your total treatment plan.    If your health care provider prescribes a medicine, you may not notice the full benefits of it for 4-8 weeks. Most people who are treated for depression need to be on medicine for at least 6-12 months after they feel better. If you are taking  medicines as part of your treatment, do not stop taking medicines without first talking to your health care provider. You may need to have the medicine slowly decreased (tapered) over time to decrease the risk of harmful side effects.  Relationships  Your health care provider may suggest family therapy along with individual therapy and drug therapy. While there may not be family problems that are causing you to feel depressed, it is still important to make sure your family learns as much as they can about your mental health. Having your family’s support can help make your treatment successful.  How to recognize changes in your condition  Everyone has a different response to treatment for depression. Recovery from major depression happens when you have not had signs of major depression for two months. This may mean that you will start to:  · Have more interest in doing activities.  · Feel less hopeless than you did 2 months ago.  · Have more energy.  · Overeat less often, or have better or improving appetite.  · Have better concentration.    Your health care provider will work with you to decide the next steps in your recovery. It is also important to recognize when your condition is getting worse. Watch for these signs:  · Having fatigue or low energy.  · Eating too much or too little.  · Sleeping too much or too little.  · Feeling restless, agitated, or hopeless.  · Having trouble concentrating or making decisions.  · Having unexplained physical complaints.  · Feeling irritable, angry, or aggressive.    Get help as soon as you or your family members notice these symptoms coming back.  How to get support and help from others  How to talk with friends and family members about your condition  Talking to friends and family members about your condition can provide you with one way to get support and guidance. Reach out to trusted friends or family members, explain your symptoms to them, and let them know that you are  working with a health care provider to treat your depression.  Financial resources  Not all insurance plans cover mental health care, so it is important to check with your insurance carrier. If paying for co-pays or counseling services is a problem, search for a local or [facility] mental health care center. They may be able to offer public mental health care services at low or no cost when you are not able to see a private health care provider.  If you are taking medicine for depression, you may be able to get the generic form, which may be less expensive. Some makers of prescription medicines also offer help to patients who cannot afford the medicines they need.  Follow these instructions at home:  · Get the right amount and quality of sleep.  · Cut down on using caffeine, tobacco, alcohol, and other potentially harmful substances.  · Try to exercise, such as walking or lifting small weights.  · Take over-the-counter and prescription medicines only as told by your health care provider.  · Eat a healthy diet that includes plenty of vegetables, fruits, whole grains, low-fat dairy products, and lean protein. Do not eat a lot of foods that are high in solid fats, added sugars, or salt.  · Keep all follow-up visits as told by your health care provider. This is important.  Contact a health care provider if:  · You stop taking your antidepressant medicines, and you have any of these symptoms:  ? Nausea.  ? Headache.  ? Feeling lightheaded.  ? Chills and body aches.  ? Not being able to sleep (insomnia).  · You or your friends and family think your depression is getting worse.  Get help right away if:  · You have thoughts of hurting yourself or others.  If you ever feel like you may hurt yourself or others, or have thoughts about taking your own life, get help right away. You can go to your nearest emergency department or call:  · Your local emergency services (911 in the [place]  · A suicide crisis helpline, such as the  National Suicide Prevention Lifeline at 1-801.732.9178. This is open 24-hours a day.    Summary  · If you are living with depression, there are ways to help you recover from it and also ways to prevent it from coming back.  · Work with your health care team to create a management plan that includes counseling, stress management techniques, and healthy lifestyle habits.  This information is not intended to replace advice given to you by your health care provider. Make sure you discuss any questions you have with your health care provider.  Document Released: 11/20/2017 Document Revised: 11/20/2017 Document Reviewed: 11/20/2017  [employer] Interactive Patient Education © 2018 [employer]

## 2019-03-07 LAB
ALBUMIN SERPL-MCNC: 4.8 G/DL (ref 3.5–5.5)
ALBUMIN/GLOB SERPL: 1.9 {RATIO} (ref 1.2–2.2)
ALP SERPL-CCNC: 102 IU/L (ref 39–117)
ALT SERPL-CCNC: 10 IU/L (ref 0–44)
AST SERPL-CCNC: 10 IU/L (ref 0–40)
BILIRUB SERPL-MCNC: 0.6 MG/DL (ref 0–1.2)
BUN SERPL-MCNC: 13 MG/DL (ref 6–24)
BUN/CREAT SERPL: 13 (ref 9–20)
CALCIUM SERPL-MCNC: 9.9 MG/DL (ref 8.7–10.2)
CHLORIDE SERPL-SCNC: 101 MMOL/L (ref 96–106)
CO2 SERPL-SCNC: 23 MMOL/L (ref 20–29)
CREAT SERPL-MCNC: 1.03 MG/DL (ref 0.76–1.27)
GLOBULIN SER CALC-MCNC: 2.5 G/DL (ref 1.5–4.5)
GLUCOSE SERPL-MCNC: 97 MG/DL (ref 65–99)
POTASSIUM SERPL-SCNC: 4.3 MMOL/L (ref 3.5–5.2)
PROT SERPL-MCNC: 7.3 G/DL (ref 6–8.5)
SODIUM SERPL-SCNC: 140 MMOL/L (ref 134–144)
VALPROATE SERPL-MCNC: 18 UG/ML (ref 50–100)

## 2019-03-25 ENCOUNTER — OFFICE VISIT (OUTPATIENT)
Dept: FAMILY MEDICINE CLINIC | Facility: CLINIC | Age: 44
End: 2019-03-25

## 2019-03-25 VITALS
RESPIRATION RATE: 16 BRPM | TEMPERATURE: 98 F | WEIGHT: 226.4 LBS | HEART RATE: 52 BPM | HEIGHT: 67 IN | DIASTOLIC BLOOD PRESSURE: 60 MMHG | SYSTOLIC BLOOD PRESSURE: 100 MMHG | BODY MASS INDEX: 35.53 KG/M2 | OXYGEN SATURATION: 98 %

## 2019-03-25 DIAGNOSIS — F41.9 ANXIETY: Primary | ICD-10-CM

## 2019-03-25 DIAGNOSIS — I45.3 RIGHT BUNDLE BLANCH BLOCK, ANTERIOR FASCICULAR BLOCK AND INCOMPLETE POSTERIOR FASCICULAR BLOCK: ICD-10-CM

## 2019-03-25 PROCEDURE — 99214 OFFICE O/P EST MOD 30 MIN: CPT | Performed by: FAMILY MEDICINE

## 2019-03-25 RX ORDER — DIAZEPAM 5 MG/1
10 TABLET ORAL EVERY 12 HOURS PRN
Qty: 6 TABLET | Refills: 0 | Status: SHIPPED | OUTPATIENT
Start: 2019-03-25 | End: 2019-10-03

## 2019-03-25 NOTE — PROGRESS NOTES
Subjective   Supa Kruse is a 43 y.o. male. Patient is being evaluated today for ER follow up. [facility] 03/25/2019 for anxiety and chest pain.   History of Present Illness     Patient was seen in the [place] [facility] 1 day ago for chest pain, shortness of breath, anxiety flare.  A chest pain work-up was done and they did not find anything abnormal except for a right bundle branch block on EKG.  He did not have a previous EKG to compare to.  He was having less chest pain by the end of the visit.  They believe he was having more of an anxiety flare.  He has been seeing me for anxiety for some time.  But he seems to be worsening and being less functional in life.  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  Today he is accompanied by someone he trusts.  They are going to be seeing a psychiatrist tomorrow morning and they are thinking that the patient needs to go as an inpatient to psychiatry as he is not progressing far enough.  He is taking the Depakote but his level was only of the 30s so he was getting worse.  He denies any suicidal thoughts or homicidal thoughts.  But he is becoming more more distressed and desperate to get back on track.    The following portions of the patient's history were reviewed and updated as appropriate: allergies, current medications, past family history, past medical history, past social history, past surgical history and problem list.    Review of Systems   Psychiatric/Behavioral: Positive for decreased concentration, dysphoric mood and sleep disturbance. Negative for agitation, behavioral problems, confusion, hallucinations, self-injury and suicidal ideas. The patient is nervous/anxious. The patient is not hyperactive.        Objective   Physical Exam   Constitutional: He appears well-developed and well-nourished. No distress.   Cardiovascular: Normal rate, regular rhythm and normal heart sounds. Exam  reveals no gallop and no friction rub.   No murmur heard.  Pulmonary/Chest: Effort normal and breath sounds normal. He has no wheezes. He has no rales.   Skin: Skin is warm. Capillary refill takes less than 2 seconds. He is not diaphoretic.   Psychiatric: His behavior is normal. Judgment and thought content normal. His mood appears anxious. His affect is labile. His speech is rapid and/or pressured. Cognition and memory are normal. He exhibits a depressed mood. He is inattentive.   Nursing note and vitals reviewed.      Assessment/Plan   Supa was seen today for follow-up.    Diagnoses and all orders for this visit:    Anxiety  -     diazePAM (VALIUM) 5 MG tablet; Take 2 tablets by mouth Every 12 (Twelve) Hours As Needed for Anxiety.    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  I am also going to refer him to cardiology due to the right bundle branch block finding.  No changes to Depakote today as he is currently seeing a psychiatrist in the morning there is no point of changing it.  Spent 30 minutes in the room with >50% of the time face to face counseling on inpatient psych.

## 2019-03-25 NOTE — PATIENT INSTRUCTIONS
Generalized Anxiety Disorder, Adult  Generalized anxiety disorder (MADELYN) is a mental health disorder. People with this condition constantly worry about everyday events. Unlike normal anxiety, worry related to MADELYN is not triggered by a specific event. These worries also do not fade or get better with time. MADELYN interferes with life functions, including relationships, work, and school.  MADELYN can vary from mild to severe. People with severe MADELYN can have intense waves of anxiety with physical symptoms (panic attacks).  What are the causes?  The exact cause of MADELYN is not known.  What increases the risk?  This condition is more likely to develop in:  · Women.  · People who have a family history of anxiety disorders.  · People who are very shy.  · People who experience very stressful life events, such as the death of a loved one.  · People who have a very stressful family environment.    What are the signs or symptoms?  People with MADELYN often worry excessively about many things in their lives, such as their health and family. They may also be overly concerned about:  · Doing well at work.  · Being on time.  · Natural disasters.  · Friendships.    Physical symptoms of MADELYN include:  · Fatigue.  · Muscle tension or having muscle twitches.  · Trembling or feeling shaky.  · Being easily startled.  · Feeling like your heart is pounding or racing.  · Feeling out of breath or like you cannot take a deep breath.  · Having trouble falling asleep or staying asleep.  · Sweating.  · Nausea, diarrhea, or irritable bowel syndrome (IBS).  · Headaches.  · Trouble concentrating or remembering facts.  · Restlessness.  · Irritability.    How is this diagnosed?  Your health care provider can diagnose MADELYN based on your symptoms and medical history. You will also have a physical exam. The health care provider will ask specific questions about your symptoms, including how severe they are, when they started, and if they come and go. Your health care  provider may ask you about your use of alcohol or drugs, including prescription medicines. Your health care provider may refer you to a mental health specialist for further evaluation.  Your health care provider will do a thorough examination and may perform additional tests to rule out other possible causes of your symptoms.  To be diagnosed with MADELYN, a person must have anxiety that:  · Is out of his or her control.  · Affects several different aspects of his or her life, such as work and relationships.  · Causes distress that makes him or her unable to take part in normal activities.  · Includes at least three physical symptoms of MADELYN, such as restlessness, fatigue, trouble concentrating, irritability, muscle tension, or sleep problems.    Before your health care provider can confirm a diagnosis of MADELYN, these symptoms must be present more days than they are not, and they must last for six months or longer.  How is this treated?  The following therapies are usually used to treat MADELYN:  · Medicine. Antidepressant medicine is usually prescribed for long-term daily control. Antianxiety medicines may be added in severe cases, especially when panic attacks occur.  · Talk therapy (psychotherapy). Certain types of talk therapy can be helpful in treating MADELYN by providing support, education, and guidance. Options include:  ? Cognitive behavioral therapy (CBT). People learn coping skills and techniques to ease their anxiety. They learn to identify unrealistic or negative thoughts and behaviors and to replace them with positive ones.  ? Acceptance and commitment therapy (ACT). This treatment teaches people how to be mindful as a way to cope with unwanted thoughts and feelings.  ? Biofeedback. This process trains you to manage your body's response (physiological response) through breathing techniques and relaxation methods. You will work with a therapist while machines are used to monitor your physical symptoms.  · Stress  management techniques. These include yoga, meditation, and exercise.    A mental health specialist can help determine which treatment is best for you. Some people see improvement with one type of therapy. However, other people require a combination of therapies.  Follow these instructions at home:  · Take over-the-counter and prescription medicines only as told by your health care provider.  · Try to maintain a normal routine.  · Try to anticipate stressful situations and allow extra time to manage them.  · Practice any stress management or self-calming techniques as taught by your health care provider.  · Do not punish yourself for setbacks or for not making progress.  · Try to recognize your accomplishments, even if they are small.  · Keep all follow-up visits as told by your health care provider. This is important.  Contact a health care provider if:  · Your symptoms do not get better.  · Your symptoms get worse.  · You have signs of depression, such as:  ? A persistently sad, cranky, or irritable mood.  ? Loss of enjoyment in activities that used to bring you neris.  ? Change in weight or eating.  ? Changes in sleeping habits.  ? Avoiding friends or family members.  ? Loss of energy for normal tasks.  ? Feelings of guilt or worthlessness.  Get help right away if:  · You have serious thoughts about hurting yourself or others.  If you ever feel like you may hurt yourself or others, or have thoughts about taking your own life, get help right away. You can go to your nearest emergency department or call:  · Your local emergency services (911 in the [place]  · A suicide crisis helpline, such as the National Suicide Prevention Lifeline at 1-811.672.8052. This is open 24 hours a day.    Summary  · Generalized anxiety disorder (MADELYN) is a mental health disorder that involves worry that is not triggered by a specific event.  · People with MADELYN often worry excessively about many things in their lives, such as their health and  family.  · MADELYN may cause physical symptoms such as restlessness, trouble concentrating, sleep problems, frequent sweating, nausea, diarrhea, headaches, and trembling or muscle twitching.  · A mental health specialist can help determine which treatment is best for you. Some people see improvement with one type of therapy. However, other people require a combination of therapies.  This information is not intended to replace advice given to you by your health care provider. Make sure you discuss any questions you have with your health care provider.  Document Released: 04/14/2014 Document Revised: 11/07/2017 Document Reviewed: 11/07/2017  [employer] Interactive Patient Education © 2019 [employer]

## 2019-04-02 RX ORDER — LISINOPRIL 20 MG/1
TABLET ORAL
Qty: 60 TABLET | Refills: 1 | Status: SHIPPED | OUTPATIENT
Start: 2019-04-02 | End: 2019-06-13 | Stop reason: SDUPTHER

## 2019-04-02 RX ORDER — CLONIDINE HYDROCHLORIDE 0.1 MG/1
TABLET ORAL
Qty: 60 TABLET | Refills: 1 | Status: SHIPPED | OUTPATIENT
Start: 2019-04-02 | End: 2019-06-13 | Stop reason: SDUPTHER

## 2019-06-13 RX ORDER — CLONIDINE HYDROCHLORIDE 0.1 MG/1
TABLET ORAL
Qty: 60 TABLET | Refills: 0 | Status: SHIPPED | OUTPATIENT
Start: 2019-06-13 | End: 2019-07-22 | Stop reason: SDUPTHER

## 2019-06-13 RX ORDER — LISINOPRIL 20 MG/1
TABLET ORAL
Qty: 60 TABLET | Refills: 0 | Status: SHIPPED | OUTPATIENT
Start: 2019-06-13 | End: 2019-07-22 | Stop reason: SDUPTHER

## 2019-07-22 RX ORDER — CLONIDINE HYDROCHLORIDE 0.1 MG/1
TABLET ORAL
Qty: 60 TABLET | Refills: 0 | Status: SHIPPED | OUTPATIENT
Start: 2019-07-22 | End: 2019-08-27 | Stop reason: SDUPTHER

## 2019-07-22 RX ORDER — LISINOPRIL 20 MG/1
TABLET ORAL
Qty: 60 TABLET | Refills: 0 | Status: SHIPPED | OUTPATIENT
Start: 2019-07-22 | End: 2019-08-27 | Stop reason: SDUPTHER

## 2019-08-27 RX ORDER — LISINOPRIL 20 MG/1
TABLET ORAL
Qty: 60 TABLET | Refills: 0 | Status: SHIPPED | OUTPATIENT
Start: 2019-08-27 | End: 2019-10-03 | Stop reason: SDUPTHER

## 2019-08-27 RX ORDER — CLONIDINE HYDROCHLORIDE 0.1 MG/1
TABLET ORAL
Qty: 60 TABLET | Refills: 0 | Status: SHIPPED | OUTPATIENT
Start: 2019-08-27 | End: 2019-10-03 | Stop reason: SDUPTHER

## 2019-10-03 ENCOUNTER — OFFICE VISIT (OUTPATIENT)
Dept: FAMILY MEDICINE CLINIC | Facility: CLINIC | Age: 44
End: 2019-10-03

## 2019-10-03 VITALS
DIASTOLIC BLOOD PRESSURE: 72 MMHG | HEIGHT: 67 IN | TEMPERATURE: 97.6 F | RESPIRATION RATE: 16 BRPM | HEART RATE: 66 BPM | WEIGHT: 245 LBS | OXYGEN SATURATION: 98 % | BODY MASS INDEX: 38.45 KG/M2 | SYSTOLIC BLOOD PRESSURE: 106 MMHG

## 2019-10-03 DIAGNOSIS — I10 BENIGN ESSENTIAL HYPERTENSION: Primary | ICD-10-CM

## 2019-10-03 DIAGNOSIS — Z23 ENCOUNTER FOR IMMUNIZATION: ICD-10-CM

## 2019-10-03 PROBLEM — F41.9 ANXIETY: Status: ACTIVE | Noted: 2019-10-03

## 2019-10-03 PROCEDURE — 99213 OFFICE O/P EST LOW 20 MIN: CPT | Performed by: FAMILY MEDICINE

## 2019-10-03 PROCEDURE — 90674 CCIIV4 VAC NO PRSV 0.5 ML IM: CPT | Performed by: FAMILY MEDICINE

## 2019-10-03 PROCEDURE — 90471 IMMUNIZATION ADMIN: CPT | Performed by: FAMILY MEDICINE

## 2019-10-03 RX ORDER — CLONIDINE HYDROCHLORIDE 0.1 MG/1
TABLET ORAL
Qty: 14 TABLET | Refills: 0 | Status: SHIPPED | OUTPATIENT
Start: 2019-10-03 | End: 2021-01-05

## 2019-10-03 RX ORDER — LISINOPRIL 20 MG/1
20 TABLET ORAL 2 TIMES DAILY
Qty: 60 TABLET | Refills: 11 | Status: SHIPPED | OUTPATIENT
Start: 2019-10-03 | End: 2020-10-13

## 2019-10-03 NOTE — PROGRESS NOTES
Subjective   Supa Kruse is a 43 y.o. male present today for medication management for hypertension.      History of Present Illness     Hypertension - stable.  Patient taking medication as prescribed.  Denies chest pain, shortness of breath, headache, lower extremity edema.  Patient is taking lisinopril 20mg BID.  Also taking clonidine 0.1mg BID.    The following portions of the patient's history were reviewed and updated as appropriate: allergies, current medications, past family history, past medical history, past social history, past surgical history and problem list.    Review of Systems   Constitutional: Negative for activity change, appetite change, fatigue and fever.   HENT: Negative for ear pain, facial swelling and sore throat.    Eyes: Negative for discharge and itching.   Respiratory: Negative for cough, chest tightness and shortness of breath.    Cardiovascular: Negative for chest pain and palpitations.   Gastrointestinal: Negative for abdominal distention and constipation.   Endocrine: Negative for polydipsia, polyphagia and polyuria.   Genitourinary: Negative for difficulty urinating and flank pain.   Musculoskeletal: Negative for arthralgias and back pain.   Skin: Negative for color change, rash and wound.   Allergic/Immunologic: Negative for environmental allergies and food allergies.   Neurological: Negative for weakness and numbness.   Hematological: Negative for adenopathy. Does not bruise/bleed easily.   Psychiatric/Behavioral: Negative for decreased concentration and dysphoric mood. The patient is not nervous/anxious.    All other systems reviewed and are negative.      Objective   Physical Exam   Constitutional: He is oriented to person, place, and time. He appears well-developed and well-nourished. No distress.   HENT:   Mouth/Throat: Oropharynx is clear and moist. No oropharyngeal exudate.   Eyes: Conjunctivae are normal. Right eye exhibits no discharge. Left eye exhibits no discharge.    Neck: Normal range of motion. Neck supple.   Cardiovascular: Normal rate, regular rhythm and normal heart sounds. Exam reveals no gallop and no friction rub.   No murmur heard.  Pulmonary/Chest: Effort normal and breath sounds normal. He has no wheezes. He has no rales.   Abdominal: Soft. Bowel sounds are normal. He exhibits no distension. There is no tenderness.   Musculoskeletal: He exhibits no edema or deformity.   Lymphadenopathy:     He has no cervical adenopathy.   Neurological: He is alert and oriented to person, place, and time.   Skin: Skin is warm and dry. No rash noted.   Psychiatric: He has a normal mood and affect. His behavior is normal.   Nursing note and vitals reviewed.      Assessment/Plan   Supa was seen today for med management.    Diagnoses and all orders for this visit:    Benign essential hypertension  -     lisinopril (PRINIVIL,ZESTRIL) 20 MG tablet; Take 1 tablet by mouth 2 (Two) Times a Day.  -     cloNIDine (CATAPRES) 0.1 MG tablet; Take 1 tablet daily for 7 days and then stop if blood pressure remains 140/90 or less    Encounter for immunization  -     Flucelvax Quad=>4Years ([id number])    Plan to continue lisinopril at current dose.  I really think he needs the clonidine as his blood pressure is so low.  I am going to taper him over proximally 7 days and then stop the medication and see how he does.  If everything is going well he can check in 1 year as his anxiety is now under the control of a psychiatrist.

## 2019-10-03 NOTE — PATIENT INSTRUCTIONS
Hypertension  Hypertension is another name for high blood pressure. High blood pressure forces your heart to work harder to pump blood. This can cause problems over time.  There are two numbers in a blood pressure reading. There is a top number (systolic) over a bottom number (diastolic). It is best to have a blood pressure below 120/80. Healthy choices can help lower your blood pressure. You may need medicine to help lower your blood pressure if:  · Your blood pressure cannot be lowered with healthy choices.  · Your blood pressure is higher than 130/80.  Follow these instructions at home:  Eating and drinking    · If directed, follow the DASH eating plan. This diet includes:  ? Filling half of your plate at each meal with fruits and vegetables.  ? Filling one quarter of your plate at each meal with whole grains. Whole grains include whole wheat pasta, brown rice, and whole grain bread.  ? Eating or drinking low-fat dairy products, such as skim milk or low-fat yogurt.  ? Filling one quarter of your plate at each meal with low-fat (lean) proteins. Low-fat proteins include fish, skinless chicken, eggs, beans, and tofu.  ? Avoiding fatty meat, cured and processed meat, or chicken with skin.  ? Avoiding premade or processed food.  · Eat less than 1,500 mg of salt (sodium) a day.  · Limit alcohol use to no more than 1 drink a day for nonpregnant women and 2 drinks a day for men. One drink equals 12 oz of beer, 5 oz of wine, or 1½ oz of hard liquor.  Lifestyle  · Work with your doctor to stay at a healthy weight or to lose weight. Ask your doctor what the best weight is for you.  · Get at least 30 minutes of exercise that causes your heart to beat faster (aerobic exercise) most days of the week. This may include walking, swimming, or biking.  · Get at least 30 minutes of exercise that strengthens your muscles (resistance exercise) at least 3 days a week. This may include lifting weights or pilates.  · Do not use any  products that contain nicotine or tobacco. This includes cigarettes and e-cigarettes. If you need help quitting, ask your doctor.  · Check your blood pressure at home as told by your doctor.  · Keep all follow-up visits as told by your doctor. This is important.  Medicines  · Take over-the-counter and prescription medicines only as told by your doctor. Follow directions carefully.  · Do not skip doses of blood pressure medicine. The medicine does not work as well if you skip doses. Skipping doses also puts you at risk for problems.  · Ask your doctor about side effects or reactions to medicines that you should watch for.  Contact a doctor if:  · You think you are having a reaction to the medicine you are taking.  · You have headaches that keep coming back (recurring).  · You feel dizzy.  · You have swelling in your ankles.  · You have trouble with your vision.  Get help right away if:  · You get a very bad headache.  · You start to feel confused.  · You feel weak or numb.  · You feel faint.  · You get very bad pain in your:  ? Chest.  ? Belly (abdomen).  · You throw up (vomit) more than once.  · You have trouble breathing.  Summary  · Hypertension is another name for high blood pressure.  · Making healthy choices can help lower blood pressure. If your blood pressure cannot be controlled with healthy choices, you may need to take medicine.  This information is not intended to replace advice given to you by your health care provider. Make sure you discuss any questions you have with your health care provider.  Document Released: 06/05/2009 Document Revised: 11/15/2017 Document Reviewed: 11/15/2017  [employer] Interactive Patient Education © 2019 [employer]

## 2019-10-18 DIAGNOSIS — I10 BENIGN ESSENTIAL HYPERTENSION: ICD-10-CM

## 2019-10-18 RX ORDER — CLONIDINE HYDROCHLORIDE 0.1 MG/1
TABLET ORAL
Qty: 14 TABLET | Refills: 0 | OUTPATIENT
Start: 2019-10-18

## 2019-10-21 ENCOUNTER — TELEPHONE (OUTPATIENT)
Dept: FAMILY MEDICINE CLINIC | Facility: CLINIC | Age: 44
End: 2019-10-21

## 2019-10-21 DIAGNOSIS — I10 BENIGN ESSENTIAL HYPERTENSION: ICD-10-CM

## 2019-10-21 NOTE — TELEPHONE ENCOUNTER
The dictation dragon got me a bit.  What I was trying to say was that I do not think he needs the clonidine as his blood pressure was so low and that he was can be tapered off to continue only on lisinopril.  He can then check in with me in 1 year as his anxiety was now under the control of a mental health professional.  There is no need for him to stay on that medicine

## 2019-10-21 NOTE — TELEPHONE ENCOUNTER
Patient called regarding his clonidine 0.1 mg. I saw in your last note that you were going to taper him off of it as long as he was still doing well with his blood pressure. He called today requesting a refill and stated he hasn't really been checking his blood pressure but was under the impression he was going to continue the clonidine once daily. Are you okay with him continuing this medication or do you want him to discontinue it?

## 2020-10-13 DIAGNOSIS — I10 BENIGN ESSENTIAL HYPERTENSION: ICD-10-CM

## 2020-10-13 RX ORDER — LISINOPRIL 20 MG/1
20 TABLET ORAL 2 TIMES DAILY
Qty: 60 TABLET | Refills: 0 | Status: SHIPPED | OUTPATIENT
Start: 2020-10-13 | End: 2020-11-18 | Stop reason: SDUPTHER

## 2020-11-18 DIAGNOSIS — I10 BENIGN ESSENTIAL HYPERTENSION: ICD-10-CM

## 2020-11-18 NOTE — TELEPHONE ENCOUNTER
Has appointment scheduled with Lorene on 12/1/2020.  Can you refill until he establishes with her? Thanks.

## 2020-11-18 NOTE — TELEPHONE ENCOUNTER
Caller: Supa Kruse    Relationship: Self    Best call back number: [phone]     Medication needed:   Requested Prescriptions     Pending Prescriptions Disp Refills   • lisinopril (PRINIVIL,ZESTRIL) 20 MG tablet 60 tablet 0     Sig: Take 1 tablet by mouth 2 (Two) Times a Day. MUST BE SEEN FOR MORE REFILLS       When do you need the refill by:TODAY    What details did the patient provide when requesting the medication:       MR. KRUSE HAS 1 TABLET LEFT, HE IS GOING OUT OF TOWN   11/19/2020, HE IS WANTING TO KNOW IF HE CAN GET A REFILL UNTIL HE CAN BE SEEN ON 12/1/2020    Does the patient have less than a 3 day supply:  [] Yes  [] No    [facility] the [facility] [place] [facility] [address]  - [phone]  - [phone] FX

## 2020-11-19 RX ORDER — LISINOPRIL 20 MG/1
20 TABLET ORAL 2 TIMES DAILY
Qty: 60 TABLET | Refills: 0 | Status: SHIPPED | OUTPATIENT
Start: 2020-11-19 | End: 2021-01-05 | Stop reason: SDUPTHER

## 2020-11-19 RX ORDER — LISINOPRIL 20 MG/1
20 TABLET ORAL 2 TIMES DAILY
Qty: 60 TABLET | Refills: 0 | OUTPATIENT
Start: 2020-11-19

## 2021-01-05 ENCOUNTER — OFFICE VISIT (OUTPATIENT)
Dept: FAMILY MEDICINE CLINIC | Facility: CLINIC | Age: 46
End: 2021-01-05

## 2021-01-05 VITALS
SYSTOLIC BLOOD PRESSURE: 112 MMHG | DIASTOLIC BLOOD PRESSURE: 76 MMHG | HEART RATE: 69 BPM | TEMPERATURE: 97.8 F | HEIGHT: 67 IN | RESPIRATION RATE: 17 BRPM | WEIGHT: 276 LBS | BODY MASS INDEX: 43.32 KG/M2 | OXYGEN SATURATION: 98 %

## 2021-01-05 DIAGNOSIS — R53.83 FATIGUE, UNSPECIFIED TYPE: ICD-10-CM

## 2021-01-05 DIAGNOSIS — I10 BENIGN ESSENTIAL HYPERTENSION: Primary | ICD-10-CM

## 2021-01-05 DIAGNOSIS — R74.8 ELEVATED LIVER ENZYMES: ICD-10-CM

## 2021-01-05 PROCEDURE — 99214 OFFICE O/P EST MOD 30 MIN: CPT | Performed by: NURSE PRACTITIONER

## 2021-01-05 RX ORDER — LISINOPRIL 20 MG/1
20 TABLET ORAL 2 TIMES DAILY
Qty: 60 TABLET | Refills: 5 | Status: SHIPPED | OUTPATIENT
Start: 2021-01-05 | End: 2021-08-20

## 2021-01-05 NOTE — PROGRESS NOTES
Subjective   Supa Kruse is a 45 y.o. male.     Chief Complaint   Patient presents with   • Med Management     hypertension        Vitals:    01/05/21 0811   BP: 112/76   Pulse: 69   Resp: 17   Temp: 97.8 °F (36.6 °C)   SpO2: 98%        History of Present Illness   This is a 45 year old male being seen today for evaluation of htn. He reports doing well with current regimen. He has a history of elevated liver enzymes and report drinking beer more over the holidays. He is concerned about his labs   The following portions of the patient's history were reviewed and updated as appropriate: allergies, current medications, past family history, past medical history, past social history, past surgical history, and problem list.    Review of Systems   Constitutional: Negative for activity change and fatigue.   HENT: Negative for nosebleeds.    Respiratory: Negative for apnea, chest tightness and shortness of breath.    Cardiovascular: Negative for chest pain and leg swelling.   Gastrointestinal: Negative for abdominal pain.   Neurological: Negative for dizziness and numbness.       Objective   Physical Exam  Vitals signs and nursing note reviewed.   HENT:      Nose: Nose normal.   Eyes:      Pupils: Pupils are equal, round, and reactive to light.   Neck:      Musculoskeletal: Neck supple.   Cardiovascular:      Rate and Rhythm: Normal rate and regular rhythm.      Pulses: Normal pulses.      Heart sounds: Normal heart sounds.   Pulmonary:      Effort: Pulmonary effort is normal. No respiratory distress.      Breath sounds: Normal breath sounds. No wheezing or rales.   Abdominal:      General: Bowel sounds are normal. There is no distension.      Tenderness: There is no abdominal tenderness.   Musculoskeletal:         General: No swelling.      Right lower leg: No edema.      Left lower leg: No edema.   Skin:     General: Skin is warm and dry.   Neurological:      Mental Status: He is alert and oriented to person, place, and  time.   Psychiatric:         Mood and Affect: Mood normal.           Assessment/Plan   Problems Addressed this Visit        Cardiac and Vasculature    Benign essential hypertension - Primary    Relevant Medications    lisinopril (PRINIVIL,ZESTRIL) 20 MG tablet    Other Relevant Orders    Comprehensive metabolic panel (Completed)       Gastrointestinal Abdominal     Elevated liver enzymes    Relevant Orders    Comprehensive metabolic panel (Completed)    Lipid panel (Completed)      Other Visit Diagnoses     Fatigue, unspecified type        Relevant Orders    CBC w AUTO Differential (Completed)      Diagnoses       Codes Comments    Benign essential hypertension    -  Primary ICD-10-CM: I10  ICD-9-CM: 401.1     Elevated liver enzymes     ICD-10-CM: R74.8  ICD-9-CM: 790.5     Fatigue, unspecified type     ICD-10-CM: R53.83  ICD-9-CM: 780.79         We will obtain a CMP, lipid panel, CBC today.  Patient will continue lisinopril as ordered.  If labs are stable he will return to see me in 6 months

## 2021-01-06 LAB
ALBUMIN SERPL-MCNC: 5 G/DL (ref 3.5–5.2)
ALBUMIN/GLOB SERPL: 1.9 G/DL
ALP SERPL-CCNC: 98 U/L (ref 39–117)
ALT SERPL-CCNC: 174 U/L (ref 1–41)
AST SERPL-CCNC: 94 U/L (ref 1–40)
BASOPHILS # BLD AUTO: 0.05 10*3/MM3 (ref 0–0.2)
BASOPHILS NFR BLD AUTO: 0.8 % (ref 0–1.5)
BILIRUB SERPL-MCNC: 0.7 MG/DL (ref 0–1.2)
BUN SERPL-MCNC: 18 MG/DL (ref 6–20)
BUN/CREAT SERPL: 21.7 (ref 7–25)
CALCIUM SERPL-MCNC: 9.8 MG/DL (ref 8.6–10.5)
CHLORIDE SERPL-SCNC: 98 MMOL/L (ref 98–107)
CHOLEST SERPL-MCNC: 207 MG/DL (ref 0–200)
CO2 SERPL-SCNC: 26.4 MMOL/L (ref 22–29)
CREAT SERPL-MCNC: 0.83 MG/DL (ref 0.76–1.27)
EOSINOPHIL # BLD AUTO: 0.22 10*3/MM3 (ref 0–0.4)
EOSINOPHIL NFR BLD AUTO: 3.4 % (ref 0.3–6.2)
ERYTHROCYTE [DISTWIDTH] IN BLOOD BY AUTOMATED COUNT: 13.1 % (ref 12.3–15.4)
GLOBULIN SER CALC-MCNC: 2.6 GM/DL
GLUCOSE SERPL-MCNC: 92 MG/DL (ref 65–99)
HCT VFR BLD AUTO: 46.3 % (ref 37.5–51)
HDLC SERPL-MCNC: 34 MG/DL (ref 40–60)
HGB BLD-MCNC: 15.4 G/DL (ref 13–17.7)
IMM GRANULOCYTES # BLD AUTO: 0.02 10*3/MM3 (ref 0–0.05)
IMM GRANULOCYTES NFR BLD AUTO: 0.3 % (ref 0–0.5)
LDLC SERPL CALC-MCNC: 141 MG/DL (ref 0–100)
LYMPHOCYTES # BLD AUTO: 1.87 10*3/MM3 (ref 0.7–3.1)
LYMPHOCYTES NFR BLD AUTO: 28.7 % (ref 19.6–45.3)
MCH RBC QN AUTO: 28.5 PG (ref 26.6–33)
MCHC RBC AUTO-ENTMCNC: 33.3 G/DL (ref 31.5–35.7)
MCV RBC AUTO: 85.7 FL (ref 79–97)
MONOCYTES # BLD AUTO: 0.6 10*3/MM3 (ref 0.1–0.9)
MONOCYTES NFR BLD AUTO: 9.2 % (ref 5–12)
NEUTROPHILS # BLD AUTO: 3.76 10*3/MM3 (ref 1.7–7)
NEUTROPHILS NFR BLD AUTO: 57.6 % (ref 42.7–76)
NRBC BLD AUTO-RTO: 0 /100 WBC (ref 0–0.2)
PLATELET # BLD AUTO: 164 10*3/MM3 (ref 140–450)
POTASSIUM SERPL-SCNC: 4.7 MMOL/L (ref 3.5–5.2)
PROT SERPL-MCNC: 7.6 G/DL (ref 6–8.5)
RBC # BLD AUTO: 5.4 10*6/MM3 (ref 4.14–5.8)
SODIUM SERPL-SCNC: 136 MMOL/L (ref 136–145)
TRIGL SERPL-MCNC: 174 MG/DL (ref 0–150)
VLDLC SERPL CALC-MCNC: 32 MG/DL (ref 5–40)
WBC # BLD AUTO: 6.52 10*3/MM3 (ref 3.4–10.8)

## 2021-06-08 ENCOUNTER — OFFICE VISIT (OUTPATIENT)
Dept: FAMILY MEDICINE CLINIC | Facility: CLINIC | Age: 46
End: 2021-06-08

## 2021-06-08 VITALS
HEART RATE: 64 BPM | HEIGHT: 67 IN | SYSTOLIC BLOOD PRESSURE: 110 MMHG | WEIGHT: 276 LBS | BODY MASS INDEX: 43.32 KG/M2 | DIASTOLIC BLOOD PRESSURE: 70 MMHG | TEMPERATURE: 97.8 F

## 2021-06-08 DIAGNOSIS — J06.9 UPPER RESPIRATORY TRACT INFECTION, UNSPECIFIED TYPE: Primary | ICD-10-CM

## 2021-06-08 PROCEDURE — 99213 OFFICE O/P EST LOW 20 MIN: CPT | Performed by: NURSE PRACTITIONER

## 2021-06-08 RX ORDER — AMOXICILLIN 500 MG/1
1000 CAPSULE ORAL 2 TIMES DAILY
Qty: 40 CAPSULE | Refills: 0 | Status: SHIPPED | OUTPATIENT
Start: 2021-06-08 | End: 2021-09-03

## 2021-06-08 NOTE — PROGRESS NOTES
"Chief Complaint  Cough ( dry non- productive ) and Nasal Congestion (4 days )    Subjective          Supa Kruse presents to [facility]  History of Present Illness  This is a 45-year-old male patient here today for evaluation of cough and congestion.  Patient reports his cough started on Saturday.  By Sunday he was more fatigued with yellow phlegm.  He denies any chest pain or increased shortness of breath.  No fever.      Objective   Vital Signs:   /70   Pulse 64   Temp 97.8 °F (36.6 °C)   Ht 170.2 cm (67\")   Wt 125 kg (276 lb)   BMI 43.23 kg/m²     Physical Exam  Vitals and nursing note reviewed.   Constitutional:       Appearance: Normal appearance.   HENT:      Head: Normocephalic.      Right Ear: Tympanic membrane normal.      Left Ear: Tympanic membrane normal.      Nose: Nose normal.      Mouth/Throat:      Mouth: Mucous membranes are moist.      Pharynx: No oropharyngeal exudate or posterior oropharyngeal erythema.   Cardiovascular:      Rate and Rhythm: Normal rate and regular rhythm.   Pulmonary:      Effort: Pulmonary effort is normal.      Breath sounds: Normal breath sounds. No wheezing or rales.   Abdominal:      General: Bowel sounds are normal.      Palpations: Abdomen is soft.   Musculoskeletal:      Cervical back: Neck supple.   Skin:     General: Skin is warm and dry.   Neurological:      Mental Status: He is alert and oriented to person, place, and time.        Result Review :                 Assessment and Plan    Diagnoses and all orders for this visit:    1. Upper respiratory tract infection, unspecified type (Primary)  -     amoxicillin (AMOXIL) 500 MG capsule; Take 2 capsules by mouth 2 (Two) Times a Day.  Dispense: 40 capsule; Refill: 0    I will give him amoxicillin to take twice a day for 10 days.  If symptoms do not improve he will return to see me for follow-up.  He does plan to come back in a month or 2 for follow-up on elevated cholesterol " at last visit    Follow Up   No follow-ups on file.  Patient was given instructions and counseling regarding his condition or for health maintenance advice. Please see specific information pulled into the AVS if appropriate.

## 2021-07-02 ENCOUNTER — TELEPHONE (OUTPATIENT)
Dept: FAMILY MEDICINE CLINIC | Facility: CLINIC | Age: 46
End: 2021-07-02

## 2021-07-02 NOTE — TELEPHONE ENCOUNTER
PATIENT CALLED AND STATES HE WAS SEEN FOR RESPIRATORY COUGH ON 6/8/21 WAS GIVEN AMOXICILLIN FOR 10 DAYS.    HE HAS FINISHED ALL THE MEDICATION AND IT WENT AWAY FOR A WHILE BUT NOW IT SEEMS TO HAVE COME BACK. HE DID USE A BUSHOG TRACTOR WITH NO MASK AND HAS BEEN HAVING SYMPTOMS OF WHEEZING, COUGHING UP CLEAR MUCUS, CHEST FEELS TIGHT, AT NIGHT IT IS REALLY BAD, BUT FEELS BETTER DURING THE DAY. NOT SURE IF ITS ALLERGIES WITH A COMBINATION OF SOMETHING ELSE    IN THE MORNING CHEST FEELS LOOSE WITH MUCUS COMING UP IN THE MORNING AND HAS DRY HEAVES AND HAS AN UPSET STOMACH FROM COUGHING SO HARD.     [facility] - [place], [place] - [address] - [phone]  - [phone]   [phone]    CALL BACK NUMBER [phone]

## 2021-07-02 NOTE — TELEPHONE ENCOUNTER
Patient does have to be seen for these types of symptoms.  He can use Flonase or Nasacort 2 sprays each nostril once daily or 1 spray in each nostril twice daily, Claritin or Zyrtec 10 mg once daily, and Mucinex DM twice daily.  If this does not improve his symptoms or if he has worsening, new or changing symptoms, he needs to be seen here or at the urgent care or ER.

## 2021-07-02 NOTE — TELEPHONE ENCOUNTER
I called and spoke with the patient. He cannot come in due to work at this time. He wanted Lorene to call something in for the symptoms he outlined.    Please advise

## 2021-07-16 ENCOUNTER — OFFICE VISIT (OUTPATIENT)
Dept: FAMILY MEDICINE CLINIC | Facility: CLINIC | Age: 46
End: 2021-07-16

## 2021-07-16 VITALS
OXYGEN SATURATION: 96 % | WEIGHT: 269 LBS | SYSTOLIC BLOOD PRESSURE: 128 MMHG | TEMPERATURE: 96.8 F | RESPIRATION RATE: 18 BRPM | HEIGHT: 67 IN | HEART RATE: 72 BPM | BODY MASS INDEX: 42.22 KG/M2 | DIASTOLIC BLOOD PRESSURE: 68 MMHG

## 2021-07-16 DIAGNOSIS — R05.9 COUGH: Primary | ICD-10-CM

## 2021-07-16 PROCEDURE — 71046 X-RAY EXAM CHEST 2 VIEWS: CPT | Performed by: NURSE PRACTITIONER

## 2021-07-16 PROCEDURE — 99213 OFFICE O/P EST LOW 20 MIN: CPT | Performed by: NURSE PRACTITIONER

## 2021-07-16 RX ORDER — PSEUDOEPHEDRINE HCL 30 MG
30 TABLET ORAL EVERY 4 HOURS PRN
COMMUNITY
End: 2021-09-03

## 2021-07-16 RX ORDER — BENZONATATE 100 MG/1
200 CAPSULE ORAL 3 TIMES DAILY PRN
Qty: 90 CAPSULE | Refills: 0 | Status: SHIPPED | OUTPATIENT
Start: 2021-07-16 | End: 2021-09-03

## 2021-07-16 RX ORDER — METHYLPREDNISOLONE 4 MG/1
TABLET ORAL
Qty: 21 TABLET | Refills: 0 | Status: SHIPPED | OUTPATIENT
Start: 2021-07-16 | End: 2021-09-03

## 2021-07-16 RX ORDER — CLONAZEPAM 1 MG/1
TABLET ORAL
COMMUNITY
Start: 2021-06-14

## 2021-07-16 RX ORDER — FLUOXETINE HYDROCHLORIDE 20 MG/1
CAPSULE ORAL
COMMUNITY
Start: 2021-06-21

## 2021-07-16 RX ORDER — TRAZODONE HYDROCHLORIDE 50 MG/1
TABLET ORAL
COMMUNITY
Start: 2021-07-14

## 2021-07-16 RX ORDER — DIPHENHYDRAMINE HCL 25 MG
25 CAPSULE ORAL EVERY 6 HOURS PRN
COMMUNITY
End: 2021-09-03

## 2021-07-16 RX ORDER — DOXYCYCLINE HYCLATE 100 MG/1
100 CAPSULE ORAL 2 TIMES DAILY
Qty: 20 CAPSULE | Refills: 0 | Status: SHIPPED | OUTPATIENT
Start: 2021-07-16 | End: 2021-09-03

## 2021-07-16 NOTE — PROGRESS NOTES
"Chief Complaint  Cough (prod in AM dx with URI 6/8/21 put on Amoxicillin has foul taste in mouth is requesting steroids that was mentioned before told he could call in for it but when called told he had to come in.) and Nasal Congestion (otc meds no relief)    Subjective          Supa Kruse presents to [facility]  History of Present Illness   This is a 45-year-old male patient here today with worsening chest congestion.  Patient was seen last month for URI symptoms.  Amoxicillin was given with improvement but later congestion worsened and now reports wheezing.  He is afebrile.  He denies any other symptoms today .      Objective   Vital Signs:   /68 (BP Location: Right arm)   Pulse 72   Temp 96.8 °F (36 °C) (Temporal)   Resp 18   Ht 170.2 cm (67\")   Wt 122 kg (269 lb)   SpO2 96%   BMI 42.13 kg/m²     Physical Exam  Vitals and nursing note reviewed.   HENT:      Head: Normocephalic.      Right Ear: Tympanic membrane normal.      Left Ear: Tympanic membrane normal.      Nose: Nose normal.      Mouth/Throat:      Mouth: Mucous membranes are moist.      Pharynx: No oropharyngeal exudate or posterior oropharyngeal erythema.   Eyes:      Pupils: Pupils are equal, round, and reactive to light.   Cardiovascular:      Rate and Rhythm: Normal rate and regular rhythm.      Pulses: Normal pulses.      Heart sounds: Normal heart sounds.   Pulmonary:      Effort: Pulmonary effort is normal. No respiratory distress.      Breath sounds: Wheezing present. No rales.   Abdominal:      General: Bowel sounds are normal. There is no distension.      Tenderness: There is no abdominal tenderness.   Musculoskeletal:         General: No swelling.      Cervical back: Neck supple.      Right lower leg: No edema.      Left lower leg: No edema.   Skin:     General: Skin is warm and dry.   Neurological:      Mental Status: He is alert and oriented to person, place, and time.   Psychiatric:         " Mood and Affect: Mood normal.        Result Review :                  Assessment and Plan    Diagnoses and all orders for this visit:    1. Cough (Primary)  -     XR Chest PA & Lateral (In Office)  -     methylPREDNISolone (MEDROL) 4 MG dose pack; Take as directed on package instructions.  Dispense: 21 tablet; Refill: 0  -     doxycycline (VIBRAMYCIN) 100 MG capsule; Take 1 capsule by mouth 2 (Two) Times a Day.  Dispense: 20 capsule; Refill: 0  -     benzonatate (Tessalon Perles) 100 MG capsule; Take 2 capsules by mouth 3 (Three) Times a Day As Needed for Cough.  Dispense: 90 capsule; Refill: 0    Chest x-ray was obtained and appears unremarkable.  I will send to radiologist for review.  I will give him a Medrol Dosepak, Tessalon Perles, doxycycline today  He will return if symptoms worsen      Follow Up   No follow-ups on file.  Patient was given instructions and counseling regarding his condition or for health maintenance advice. Please see specific information pulled into the AVS if appropriate.

## 2021-07-30 ENCOUNTER — TELEPHONE (OUTPATIENT)
Dept: FAMILY MEDICINE CLINIC | Facility: CLINIC | Age: 46
End: 2021-07-30

## 2021-08-20 DIAGNOSIS — I10 BENIGN ESSENTIAL HYPERTENSION: ICD-10-CM

## 2021-08-20 RX ORDER — LISINOPRIL 20 MG/1
TABLET ORAL
Qty: 16 TABLET | Refills: 0 | Status: SHIPPED | OUTPATIENT
Start: 2021-08-20 | End: 2021-09-03 | Stop reason: SDUPTHER

## 2021-09-03 ENCOUNTER — OFFICE VISIT (OUTPATIENT)
Dept: FAMILY MEDICINE CLINIC | Facility: CLINIC | Age: 46
End: 2021-09-03

## 2021-09-03 VITALS
OXYGEN SATURATION: 95 % | RESPIRATION RATE: 18 BRPM | TEMPERATURE: 97.3 F | HEIGHT: 67 IN | HEART RATE: 74 BPM | SYSTOLIC BLOOD PRESSURE: 138 MMHG | WEIGHT: 276 LBS | DIASTOLIC BLOOD PRESSURE: 86 MMHG | BODY MASS INDEX: 43.32 KG/M2

## 2021-09-03 DIAGNOSIS — Z00.00 ROUTINE ADULT HEALTH MAINTENANCE: ICD-10-CM

## 2021-09-03 DIAGNOSIS — I10 BENIGN ESSENTIAL HYPERTENSION: Primary | ICD-10-CM

## 2021-09-03 DIAGNOSIS — Z13.220 SCREENING FOR HYPERLIPIDEMIA: ICD-10-CM

## 2021-09-03 DIAGNOSIS — Z13.0 SCREENING FOR DEFICIENCY ANEMIA: ICD-10-CM

## 2021-09-03 LAB
ALBUMIN SERPL-MCNC: 4.6 G/DL (ref 3.5–5.2)
ALBUMIN/GLOB SERPL: 2.6 G/DL
ALP SERPL-CCNC: 93 U/L (ref 39–117)
ALT SERPL-CCNC: 88 U/L (ref 1–41)
AST SERPL-CCNC: 63 U/L (ref 1–40)
BASOPHILS # BLD AUTO: 0.04 10*3/MM3 (ref 0–0.2)
BASOPHILS NFR BLD AUTO: 0.6 % (ref 0–1.5)
BILIRUB SERPL-MCNC: 0.6 MG/DL (ref 0–1.2)
BUN SERPL-MCNC: 13 MG/DL (ref 6–20)
BUN/CREAT SERPL: 16.3 (ref 7–25)
CALCIUM SERPL-MCNC: 9.8 MG/DL (ref 8.6–10.5)
CHLORIDE SERPL-SCNC: 100 MMOL/L (ref 98–107)
CHOLEST SERPL-MCNC: 244 MG/DL (ref 0–200)
CO2 SERPL-SCNC: 25.9 MMOL/L (ref 22–29)
CREAT SERPL-MCNC: 0.8 MG/DL (ref 0.76–1.27)
EOSINOPHIL # BLD AUTO: 0.32 10*3/MM3 (ref 0–0.4)
EOSINOPHIL NFR BLD AUTO: 4.7 % (ref 0.3–6.2)
ERYTHROCYTE [DISTWIDTH] IN BLOOD BY AUTOMATED COUNT: 13.2 % (ref 12.3–15.4)
GLOBULIN SER CALC-MCNC: 1.8 GM/DL
GLUCOSE SERPL-MCNC: 99 MG/DL (ref 65–99)
HCT VFR BLD AUTO: 44.6 % (ref 37.5–51)
HDLC SERPL-MCNC: 41 MG/DL (ref 40–60)
HGB BLD-MCNC: 15.2 G/DL (ref 13–17.7)
IMM GRANULOCYTES # BLD AUTO: 0.03 10*3/MM3 (ref 0–0.05)
IMM GRANULOCYTES NFR BLD AUTO: 0.4 % (ref 0–0.5)
LDLC SERPL CALC-MCNC: 162 MG/DL (ref 0–100)
LDLC/HDLC SERPL: 3.88 {RATIO}
LYMPHOCYTES # BLD AUTO: 1.91 10*3/MM3 (ref 0.7–3.1)
LYMPHOCYTES NFR BLD AUTO: 28 % (ref 19.6–45.3)
MCH RBC QN AUTO: 29.3 PG (ref 26.6–33)
MCHC RBC AUTO-ENTMCNC: 34.1 G/DL (ref 31.5–35.7)
MCV RBC AUTO: 86.1 FL (ref 79–97)
MONOCYTES # BLD AUTO: 0.61 10*3/MM3 (ref 0.1–0.9)
MONOCYTES NFR BLD AUTO: 8.9 % (ref 5–12)
NEUTROPHILS # BLD AUTO: 3.92 10*3/MM3 (ref 1.7–7)
NEUTROPHILS NFR BLD AUTO: 57.4 % (ref 42.7–76)
NRBC BLD AUTO-RTO: 0 /100 WBC (ref 0–0.2)
PLATELET # BLD AUTO: 159 10*3/MM3 (ref 140–450)
POTASSIUM SERPL-SCNC: 4.6 MMOL/L (ref 3.5–5.2)
PROT SERPL-MCNC: 6.4 G/DL (ref 6–8.5)
RBC # BLD AUTO: 5.18 10*6/MM3 (ref 4.14–5.8)
SODIUM SERPL-SCNC: 137 MMOL/L (ref 136–145)
TRIGL SERPL-MCNC: 219 MG/DL (ref 0–150)
VLDLC SERPL CALC-MCNC: 41 MG/DL (ref 5–40)
WBC # BLD AUTO: 6.83 10*3/MM3 (ref 3.4–10.8)

## 2021-09-03 PROCEDURE — 99213 OFFICE O/P EST LOW 20 MIN: CPT | Performed by: NURSE PRACTITIONER

## 2021-09-03 PROCEDURE — 99396 PREV VISIT EST AGE 40-64: CPT | Performed by: NURSE PRACTITIONER

## 2021-09-03 RX ORDER — LISINOPRIL 20 MG/1
20 TABLET ORAL 2 TIMES DAILY
Qty: 180 TABLET | Refills: 1 | Status: SHIPPED | OUTPATIENT
Start: 2021-09-03 | End: 2022-03-29

## 2021-09-03 NOTE — PROGRESS NOTES
"Chief Complaint  Hypertension (has only been taking one dose or not at all of b/p meds been out 3 days.), Anxiety, and Med Refill    Subjective          Supa Kruse presents to [facility]  History of Present Illness   This is a 45-year-old male patient here today for CPE.  Patient has a history of hypertension that is well controlled with lisinopril.  He is also currently on Prozac and Klonopin for anxiety that is being managed by psychiatry.  He has a history of elevated liver enzymes in the past due to alcohol.  He states he is drinking more than usual.  He denies any GI symptoms today     Objective   Vital Signs:   /86 (BP Location: Left arm)   Pulse 74   Temp 97.3 °F (36.3 °C) (Temporal)   Resp 18   Ht 170.2 cm (67\")   Wt 125 kg (276 lb)   SpO2 95%   BMI 43.23 kg/m²     Physical Exam  Constitutional:       Appearance: Normal appearance.   HENT:      Head: Normocephalic.      Right Ear: Tympanic membrane normal.      Left Ear: Tympanic membrane normal.      Nose: Nose normal.      Mouth/Throat:      Mouth: Mucous membranes are moist.   Eyes:      Pupils: Pupils are equal, round, and reactive to light.   Cardiovascular:      Rate and Rhythm: Normal rate and regular rhythm.      Pulses: Normal pulses.      Heart sounds: Normal heart sounds.   Pulmonary:      Effort: Pulmonary effort is normal.      Breath sounds: Normal breath sounds.   Abdominal:      General: Bowel sounds are normal. There is no distension.      Palpations: There is no mass.   Musculoskeletal:         General: No swelling or tenderness.      Cervical back: Normal range of motion and neck supple.      Right lower leg: No edema.      Left lower leg: No edema.   Feet:      Comments:      Skin:     General: Skin is warm and dry.   Neurological:      Mental Status: He is alert and oriented to person, place, and time.   Psychiatric:         Mood and Affect: Mood normal.         Behavior: Behavior normal. "        Result Review :                 Assessment and Plan    Diagnoses and all orders for this visit:    1. Routine adult health maintenance (Primary)    2. Benign essential hypertension  -     lisinopril (PRINIVIL,ZESTRIL) 20 MG tablet; Take 1 tablet by mouth 2 (two) times a day.  Dispense: 180 tablet; Refill: 1    3. Screening for hyperlipidemia  -     Comprehensive metabolic panel  -     Lipid Panel With LDL/HDL Ratio    4. Screening for deficiency anemia  -     CBC w AUTO Differential    We will obtain fasting labs today.  We will continue current regimen as ordered    Follow Up   Return in about 6 months (around 3/3/2022).  Patient was given instructions and counseling regarding his condition or for health maintenance advice. Please see specific information pulled into the AVS if appropriate.

## 2021-09-08 NOTE — PROGRESS NOTES
"Chief Complaint  Hypertension (has only been taking one dose or not at all of b/p meds been out 3 days.), Anxiety, and Med Refill    Subjective          Supa Kruse presents to [facility]  History of Present Illness  45-year-old male patient here today for hypertension.  He is currently on lisinopril.  He has been out of his blood pressure medicine for a couple days but received refills and is now back on schedule.  He denies any headache chest pain or shortness of breath.  He denies any lower extremity edema.  He reports following a low salt low-fat diet.  He does admit drinking alcohol this summer    Objective   Vital Signs:   /86 (BP Location: Left arm)   Pulse 74   Temp 97.3 °F (36.3 °C) (Temporal)   Resp 18   Ht 170.2 cm (67\")   Wt 125 kg (276 lb)   SpO2 95%   BMI 43.23 kg/m²     Physical Exam  Vitals and nursing note reviewed.   HENT:      Head: Normocephalic.      Nose: Nose normal.   Eyes:      Pupils: Pupils are equal, round, and reactive to light.   Cardiovascular:      Rate and Rhythm: Normal rate and regular rhythm.      Pulses: Normal pulses.      Heart sounds: Normal heart sounds.   Pulmonary:      Effort: Pulmonary effort is normal. No respiratory distress.      Breath sounds: Normal breath sounds. No wheezing or rales.   Abdominal:      General: Bowel sounds are normal. There is no distension.      Tenderness: There is no abdominal tenderness.   Musculoskeletal:         General: No swelling.      Cervical back: Neck supple.      Right lower leg: No edema.      Left lower leg: No edema.   Skin:     General: Skin is warm and dry.   Neurological:      Mental Status: He is alert and oriented to person, place, and time.   Psychiatric:         Mood and Affect: Mood normal.        Result Review :                 Assessment and Plan    Diagnoses and all orders for this visit:    1. Benign essential hypertension (Primary)  -     lisinopril (PRINIVIL,ZESTRIL) 20 MG " tablet; Take 1 tablet by mouth 2 (two) times a day.  Dispense: 180 tablet; Refill: 1    2. Screening for hyperlipidemia  -     Comprehensive metabolic panel  -     Lipid Panel With LDL/HDL Ratio    3. Screening for deficiency anemia  -     CBC w AUTO Differential    4. Routine adult health maintenance    We will continue his regimen as ordered with refills.  We will obtain lab work today    Follow Up   Return in about 6 months (around 3/3/2022).  Patient was given instructions and counseling regarding his condition or for health maintenance advice. Please see specific information pulled into the AVS if appropriate.

## 2022-03-29 DIAGNOSIS — I10 BENIGN ESSENTIAL HYPERTENSION: ICD-10-CM

## 2022-03-29 RX ORDER — LISINOPRIL 20 MG/1
TABLET ORAL
Qty: 60 TABLET | Refills: 0 | Status: SHIPPED | OUTPATIENT
Start: 2022-03-29 | End: 2022-05-24 | Stop reason: SDUPTHER

## 2022-03-29 NOTE — TELEPHONE ENCOUNTER
Last ov 9/3/21    Return in about 6 months (around 3/3/2022).  Patient was given instructions and counseling regarding his condition or for health maintenance advice. Please see specific information pulled into the AVS if appropriate.       Last lab 9/3/21

## 2022-05-24 DIAGNOSIS — I10 BENIGN ESSENTIAL HYPERTENSION: ICD-10-CM

## 2022-05-24 RX ORDER — LISINOPRIL 20 MG/1
20 TABLET ORAL 2 TIMES DAILY
Qty: 60 TABLET | Refills: 0 | Status: SHIPPED | OUTPATIENT
Start: 2022-05-24 | End: 2022-07-08 | Stop reason: SDUPTHER

## 2022-07-08 ENCOUNTER — OFFICE VISIT (OUTPATIENT)
Dept: FAMILY MEDICINE CLINIC | Facility: CLINIC | Age: 47
End: 2022-07-08

## 2022-07-08 VITALS
SYSTOLIC BLOOD PRESSURE: 132 MMHG | DIASTOLIC BLOOD PRESSURE: 62 MMHG | WEIGHT: 285 LBS | OXYGEN SATURATION: 98 % | BODY MASS INDEX: 44.73 KG/M2 | HEART RATE: 70 BPM | HEIGHT: 67 IN | RESPIRATION RATE: 18 BRPM | TEMPERATURE: 97.8 F

## 2022-07-08 DIAGNOSIS — I10 BENIGN ESSENTIAL HYPERTENSION: Primary | ICD-10-CM

## 2022-07-08 DIAGNOSIS — E78.2 HYPERLIPIDEMIA, MIXED: ICD-10-CM

## 2022-07-08 DIAGNOSIS — Z13.0 SCREENING FOR DEFICIENCY ANEMIA: ICD-10-CM

## 2022-07-08 DIAGNOSIS — R74.8 ELEVATED LIVER ENZYMES: ICD-10-CM

## 2022-07-08 DIAGNOSIS — Z12.11 SCREENING FOR COLON CANCER: ICD-10-CM

## 2022-07-08 PROCEDURE — 99214 OFFICE O/P EST MOD 30 MIN: CPT | Performed by: NURSE PRACTITIONER

## 2022-07-08 RX ORDER — LISINOPRIL 20 MG/1
20 TABLET ORAL 2 TIMES DAILY
Qty: 60 TABLET | Refills: 5 | Status: SHIPPED | OUTPATIENT
Start: 2022-07-08 | End: 2023-03-08 | Stop reason: SDUPTHER

## 2022-07-08 NOTE — PROGRESS NOTES
"Chief Complaint  No chief complaint on file.    Subjective        Supa Kruse presents to [facility]  History of Present Illness  This is a 45 yo male patient here today for f/u on htn and elevated liver enzymes. Patient denies any chest pain or shortness of breath. He is doing well with lisinipril. He does continue to drink on occasion. He is following psychiatry for his depression/anxiety. His son is moving to college this fall and reports being anxious.He is motivated to be healthy and is watching his alchohol consumption. He denies any abdominal pain or nausea/vomiting.      Objective   Vital Signs:  /62   Pulse 70   Temp 97.8 °F (36.6 °C)   Resp 18   Ht 170.2 cm (67.01\")   Wt 129 kg (285 lb)   SpO2 98%   BMI 44.63 kg/m²   Estimated body mass index is 44.63 kg/m² as calculated from the following:    Height as of this encounter: 170.2 cm (67.01\").    Weight as of this encounter: 129 kg (285 lb).          Physical Exam  Vitals and nursing note reviewed.   HENT:      Head: Normocephalic.      Nose: Nose normal.   Eyes:      Pupils: Pupils are equal, round, and reactive to light.   Cardiovascular:      Rate and Rhythm: Normal rate and regular rhythm.      Pulses: Normal pulses.      Heart sounds: Normal heart sounds.   Pulmonary:      Effort: Pulmonary effort is normal. No respiratory distress.      Breath sounds: Normal breath sounds. No wheezing or rales.   Abdominal:      General: Bowel sounds are normal. There is no distension.      Tenderness: There is no abdominal tenderness.   Musculoskeletal:         General: No swelling.      Cervical back: Neck supple.      Right lower leg: No edema.      Left lower leg: No edema.   Skin:     General: Skin is warm and dry.   Neurological:      Mental Status: He is alert and oriented to person, place, and time.   Psychiatric:         Mood and Affect: Mood normal.        Result Review :                Assessment and Plan "   Diagnoses and all orders for this visit:    1. Benign essential hypertension (Primary)  -     Urinalysis With Microscopic - Urine, Clean Catch  -     lisinopril (PRINIVIL,ZESTRIL) 20 MG tablet; Take 1 tablet by mouth 2 (Two) Times a Day.  Dispense: 60 tablet; Refill: 5    2. Hyperlipidemia, mixed  -     Comprehensive Metabolic Panel  -     Lipid Panel    3. Screening for deficiency anemia  -     CBC & Differential    4. Screening for colon cancer  -     Ambulatory Referral to Gastroenterology    5. Elevated liver enzymes  -     Ambulatory Referral to Gastroenterology    Other orders  -     Microscopic Examination -    He is fasting today and will get labs.  He is due colonoscopy and after discussing his liver functions in the past I will put in GI consult for colon cancer screening and to evaluate his liver function. Depending on labwork I may order US of liver.   He will continue his current regimen for now and will return to f/u in 6 months         Follow Up   Return in about 6 months (around 1/8/2023).  Patient was given instructions and counseling regarding his condition or for health maintenance advice. Please see specific information pulled into the AVS if appropriate.

## 2022-07-09 LAB
ALBUMIN SERPL-MCNC: 4.6 G/DL (ref 4–5)
ALBUMIN/GLOB SERPL: 1.7 {RATIO} (ref 1.2–2.2)
ALP SERPL-CCNC: 116 IU/L (ref 44–121)
ALT SERPL-CCNC: 119 IU/L (ref 0–44)
APPEARANCE UR: CLEAR
AST SERPL-CCNC: 102 IU/L (ref 0–40)
BACTERIA #/AREA URNS HPF: NORMAL /[HPF]
BASOPHILS # BLD AUTO: 0 X10E3/UL (ref 0–0.2)
BASOPHILS NFR BLD AUTO: 1 %
BILIRUB SERPL-MCNC: 0.7 MG/DL (ref 0–1.2)
BILIRUB UR QL STRIP: NEGATIVE
BUN SERPL-MCNC: 15 MG/DL (ref 6–24)
BUN/CREAT SERPL: 18 (ref 9–20)
CALCIUM SERPL-MCNC: 9.7 MG/DL (ref 8.7–10.2)
CASTS URNS QL MICRO: NORMAL /LPF
CHLORIDE SERPL-SCNC: 99 MMOL/L (ref 96–106)
CHOLEST SERPL-MCNC: 259 MG/DL (ref 100–199)
CO2 SERPL-SCNC: 25 MMOL/L (ref 20–29)
COLOR UR: YELLOW
CREAT SERPL-MCNC: 0.83 MG/DL (ref 0.76–1.27)
EGFRCR SERPLBLD CKD-EPI 2021: 110 ML/MIN/1.73
EOSINOPHIL # BLD AUTO: 0.1 X10E3/UL (ref 0–0.4)
EOSINOPHIL NFR BLD AUTO: 2 %
EPI CELLS #/AREA URNS HPF: NORMAL /HPF (ref 0–10)
ERYTHROCYTE [DISTWIDTH] IN BLOOD BY AUTOMATED COUNT: 13.4 % (ref 11.6–15.4)
GLOBULIN SER CALC-MCNC: 2.7 G/DL (ref 1.5–4.5)
GLUCOSE SERPL-MCNC: 116 MG/DL (ref 65–99)
GLUCOSE UR QL STRIP: NEGATIVE
HCT VFR BLD AUTO: 47 % (ref 37.5–51)
HDLC SERPL-MCNC: 44 MG/DL
HGB BLD-MCNC: 15.8 G/DL (ref 13–17.7)
HGB UR QL STRIP: NEGATIVE
IMM GRANULOCYTES # BLD AUTO: 0.1 X10E3/UL (ref 0–0.1)
IMM GRANULOCYTES NFR BLD AUTO: 1 %
KETONES UR QL STRIP: NEGATIVE
LDLC SERPL CALC-MCNC: 175 MG/DL (ref 0–99)
LEUKOCYTE ESTERASE UR QL STRIP: NEGATIVE
LYMPHOCYTES # BLD AUTO: 1.5 X10E3/UL (ref 0.7–3.1)
LYMPHOCYTES NFR BLD AUTO: 19 %
MCH RBC QN AUTO: 30.2 PG (ref 26.6–33)
MCHC RBC AUTO-ENTMCNC: 33.6 G/DL (ref 31.5–35.7)
MCV RBC AUTO: 90 FL (ref 79–97)
MICRO URNS: NORMAL
MICRO URNS: NORMAL
MONOCYTES # BLD AUTO: 0.6 X10E3/UL (ref 0.1–0.9)
MONOCYTES NFR BLD AUTO: 8 %
NEUTROPHILS # BLD AUTO: 5.8 X10E3/UL (ref 1.4–7)
NEUTROPHILS NFR BLD AUTO: 69 %
NITRITE UR QL STRIP: NEGATIVE
PH UR STRIP: 6 [PH] (ref 5–7.5)
PLATELET # BLD AUTO: 138 X10E3/UL (ref 150–450)
POTASSIUM SERPL-SCNC: 4.7 MMOL/L (ref 3.5–5.2)
PROT SERPL-MCNC: 7.3 G/DL (ref 6–8.5)
PROT UR QL STRIP: NEGATIVE
RBC # BLD AUTO: 5.24 X10E6/UL (ref 4.14–5.8)
RBC #/AREA URNS HPF: NORMAL /HPF (ref 0–2)
SODIUM SERPL-SCNC: 138 MMOL/L (ref 134–144)
SP GR UR STRIP: 1.02 (ref 1–1.03)
TRIGL SERPL-MCNC: 211 MG/DL (ref 0–149)
UROBILINOGEN UR STRIP-MCNC: 1 MG/DL (ref 0.2–1)
VLDLC SERPL CALC-MCNC: 40 MG/DL (ref 5–40)
WBC # BLD AUTO: 8.1 X10E3/UL (ref 3.4–10.8)
WBC #/AREA URNS HPF: NORMAL /HPF (ref 0–5)

## 2022-07-11 DIAGNOSIS — R74.8 ELEVATED LIVER ENZYMES: Primary | ICD-10-CM

## 2022-07-27 ENCOUNTER — APPOINTMENT (OUTPATIENT)
Dept: ULTRASOUND IMAGING | Facility: HOSPITAL | Age: 47
End: 2022-07-27

## 2022-08-04 ENCOUNTER — HOSPITAL ENCOUNTER (OUTPATIENT)
Dept: ULTRASOUND IMAGING | Facility: HOSPITAL | Age: 47
Discharge: HOME | End: 2022-08-04
Admitting: NURSE PRACTITIONER

## 2022-08-04 PROCEDURE — 76705 ECHO EXAM OF ABDOMEN: CPT

## 2023-03-08 DIAGNOSIS — I10 BENIGN ESSENTIAL HYPERTENSION: ICD-10-CM

## 2023-03-08 RX ORDER — LISINOPRIL 20 MG/1
20 TABLET ORAL 2 TIMES DAILY
Qty: 60 TABLET | Refills: 0 | Status: SHIPPED | OUTPATIENT
Start: 2023-03-08

## 2023-03-08 NOTE — TELEPHONE ENCOUNTER
Caller: Supa Kruse    Relationship: Self    Best call back number: [phone]    Requested Prescriptions:   Requested Prescriptions     Pending Prescriptions Disp Refills   • lisinopril (PRINIVIL,ZESTRIL) 20 MG tablet 60 tablet 5     Sig: Take 1 tablet by mouth 2 (Two) Times a Day.        Pharmacy where request should be sent: [facility] [address] - [phone]  - [phone]     Additional details provided by patient: PATIENT IS OUT OF THE MEDICATION. PATIENT IS LEAVING FOR [place] THIS WEEKEND. PATIENT HAS FOLLOW UP APPOINTMENT SCHEDULED FOR 03.27.23    Does the patient have less than a 3 day supply:  [x] Yes  [] No    Would you like a call back once the refill request has been completed: [] Yes [x] No      Hien Perkins Rep   03/08/23 13:57 EST

## 2023-05-26 ENCOUNTER — OFFICE VISIT (OUTPATIENT)
Dept: FAMILY MEDICINE CLINIC | Facility: CLINIC | Age: 48
End: 2023-05-26

## 2023-05-26 DIAGNOSIS — Z80.42 FAMILY HX OF PROSTATE CANCER: ICD-10-CM

## 2023-05-26 DIAGNOSIS — R35.0 URINARY FREQUENCY: ICD-10-CM

## 2023-05-26 DIAGNOSIS — I10 BENIGN ESSENTIAL HYPERTENSION: Primary | ICD-10-CM

## 2023-05-26 DIAGNOSIS — Z12.11 COLON CANCER SCREENING: ICD-10-CM

## 2023-05-26 DIAGNOSIS — R73.09 ELEVATED GLUCOSE: ICD-10-CM

## 2023-05-26 DIAGNOSIS — Z13.0 SCREENING FOR DEFICIENCY ANEMIA: ICD-10-CM

## 2023-05-26 DIAGNOSIS — E78.2 HYPERLIPIDEMIA, MIXED: ICD-10-CM

## 2023-05-26 PROCEDURE — 99214 OFFICE O/P EST MOD 30 MIN: CPT | Performed by: NURSE PRACTITIONER

## 2023-05-26 RX ORDER — LISINOPRIL 20 MG/1
20 TABLET ORAL 2 TIMES DAILY
Qty: 60 TABLET | Refills: 5 | Status: SHIPPED | OUTPATIENT
Start: 2023-05-26

## 2023-05-26 RX ORDER — FLUOXETINE HYDROCHLORIDE 40 MG/1
2 CAPSULE ORAL DAILY
COMMUNITY
Start: 2023-05-17

## 2023-05-26 RX ORDER — CLONAZEPAM 0.5 MG/1
1 TABLET ORAL EVERY 12 HOURS SCHEDULED
COMMUNITY
Start: 2023-05-17

## 2023-05-26 NOTE — PROGRESS NOTES
"Chief Complaint  Hypertension and Med Management    Subjective        Supa Krsue presents to [facility]  History of Present Illness  Is a 47-year-old male patient here today for follow-up on hypertension, insomnia, anxiety, elevated liver enzymes.  Patient's blood pressure has been stable with lisinopril.  He denies any chest pain or shortness of breath.  He has had elevated liver enzymes in the past and does have episodes where he will drink beer.  His son has gone to college and he lost his wife several years back to cancer.  He does cope with alcohol.  He does stop his alcohol intake in numbers have improved in the past.  He is working on finding other ways to cope and working on diet and exercise.  He is being followed by psychiatry.  He does report his mood is good and denies any suicidal thoughts.  He does report some urinary frequency.  He denies any hematuria.  He does report his dad had prostate cancer in his 60s.     Objective   Vital Signs:  /76   Pulse 70   Temp 97.5 °F (36.4 °C)   Ht 170.2 cm (67.01\")   Wt 126 kg (278 lb)   SpO2 96%   BMI 43.53 kg/m²   Estimated body mass index is 43.53 kg/m² as calculated from the following:    Height as of this encounter: 170.2 cm (67.01\").    Weight as of this encounter: 126 kg (278 lb).             Physical Exam  Vitals and nursing note reviewed.   HENT:      Head: Normocephalic.      Nose: Nose normal.   Eyes:      Pupils: Pupils are equal, round, and reactive to light.   Cardiovascular:      Rate and Rhythm: Normal rate and regular rhythm.      Pulses: Normal pulses.      Heart sounds: Normal heart sounds.   Pulmonary:      Effort: Pulmonary effort is normal. No respiratory distress.      Breath sounds: Normal breath sounds. No wheezing or rales.   Abdominal:      General: Bowel sounds are normal. There is no distension.      Tenderness: There is no abdominal tenderness.   Musculoskeletal:         General: No swelling. "      Cervical back: Neck supple.      Right lower leg: No edema.      Left lower leg: No edema.   Skin:     General: Skin is warm and dry.   Neurological:      Mental Status: He is alert and oriented to person, place, and time.   Psychiatric:         Mood and Affect: Mood normal.      Result Review :                   Assessment and Plan   Diagnoses and all orders for this visit:    1. Benign essential hypertension (Primary)  -     Urinalysis With Culture If Indicated - Urine, Clean Catch  -     lisinopril (PRINIVIL,ZESTRIL) 20 MG tablet; Take 1 tablet by mouth 2 (Two) Times a Day.  Dispense: 60 tablet; Refill: 5    2. Hyperlipidemia, mixed  -     Comprehensive Metabolic Panel  -     Lipid Panel    3. Screening for deficiency anemia  -     CBC & Differential    4. Family hx of prostate cancer  -     PSA DIAGNOSTIC    5. Elevated glucose  -     Hemoglobin A1c    6. Urinary frequency  -     PSA DIAGNOSTIC    7. Colon cancer screening  -     Cologuard - Stool, Per Rectum; Future    Other orders  -     Microscopic Examination -      He is fasting so we will check lab work today along with PSA.  Also had elevated glucose we will check a hemoglobin A1c  We will continue his current regimen.  He is going to work on receiving any alcohol intake.  We did discuss if liver enzymes are elevated we may need to consider GI referral.  He has had an ultrasound of the liver in the past showed fatty liver.  We will continue to follow psychiatry  Did discuss colon cancer screening and I will put a Cologuard in today           Follow Up   No follow-ups on file.  Patient was given instructions and counseling regarding his condition or for health maintenance advice. Please see specific information pulled into the AVS if appropriate.

## 2023-05-27 LAB
ALBUMIN SERPL-MCNC: 4.4 G/DL (ref 3.5–5.2)
ALBUMIN/GLOB SERPL: 1.8 G/DL
ALP SERPL-CCNC: 122 U/L (ref 39–117)
ALT SERPL-CCNC: 88 U/L (ref 1–41)
APPEARANCE UR: CLEAR
AST SERPL-CCNC: 42 U/L (ref 1–40)
BACTERIA #/AREA URNS HPF: NORMAL /HPF
BASOPHILS # BLD AUTO: 0.02 10*3/MM3 (ref 0–0.2)
BASOPHILS NFR BLD AUTO: 0.3 % (ref 0–1.5)
BILIRUB SERPL-MCNC: 0.3 MG/DL (ref 0–1.2)
BILIRUB UR QL STRIP: NEGATIVE
BUN SERPL-MCNC: 13 MG/DL (ref 6–20)
BUN/CREAT SERPL: 14.8 (ref 7–25)
CALCIUM SERPL-MCNC: 9.7 MG/DL (ref 8.6–10.5)
CASTS URNS QL MICRO: NORMAL /LPF
CHLORIDE SERPL-SCNC: 99 MMOL/L (ref 98–107)
CHOLEST SERPL-MCNC: 227 MG/DL (ref 0–200)
CO2 SERPL-SCNC: 27.6 MMOL/L (ref 22–29)
COLOR UR: YELLOW
CREAT SERPL-MCNC: 0.88 MG/DL (ref 0.76–1.27)
EGFRCR SERPLBLD CKD-EPI 2021: 107.4 ML/MIN/1.73
EOSINOPHIL # BLD AUTO: 0.23 10*3/MM3 (ref 0–0.4)
EOSINOPHIL NFR BLD AUTO: 3 % (ref 0.3–6.2)
EPI CELLS #/AREA URNS HPF: NORMAL /HPF (ref 0–10)
ERYTHROCYTE [DISTWIDTH] IN BLOOD BY AUTOMATED COUNT: 13.2 % (ref 12.3–15.4)
GLOBULIN SER CALC-MCNC: 2.5 GM/DL
GLUCOSE SERPL-MCNC: 114 MG/DL (ref 65–99)
GLUCOSE UR QL STRIP: NEGATIVE
HBA1C MFR BLD: 5.7 % (ref 4.8–5.6)
HCT VFR BLD AUTO: 46.6 % (ref 37.5–51)
HDLC SERPL-MCNC: 44 MG/DL (ref 40–60)
HGB BLD-MCNC: 16 G/DL (ref 13–17.7)
HGB UR QL STRIP: NEGATIVE
IMM GRANULOCYTES # BLD AUTO: 0.03 10*3/MM3 (ref 0–0.05)
IMM GRANULOCYTES NFR BLD AUTO: 0.4 % (ref 0–0.5)
KETONES UR QL STRIP: NEGATIVE
LDLC SERPL CALC-MCNC: 161 MG/DL (ref 0–100)
LEUKOCYTE ESTERASE UR QL STRIP: NEGATIVE
LYMPHOCYTES # BLD AUTO: 1.92 10*3/MM3 (ref 0.7–3.1)
LYMPHOCYTES NFR BLD AUTO: 25.2 % (ref 19.6–45.3)
MCH RBC QN AUTO: 29.9 PG (ref 26.6–33)
MCHC RBC AUTO-ENTMCNC: 34.3 G/DL (ref 31.5–35.7)
MCV RBC AUTO: 87.1 FL (ref 79–97)
MICRO URNS: NORMAL
MICRO URNS: NORMAL
MONOCYTES # BLD AUTO: 0.61 10*3/MM3 (ref 0.1–0.9)
MONOCYTES NFR BLD AUTO: 8 % (ref 5–12)
NEUTROPHILS # BLD AUTO: 4.81 10*3/MM3 (ref 1.7–7)
NEUTROPHILS NFR BLD AUTO: 63.1 % (ref 42.7–76)
NITRITE UR QL STRIP: NEGATIVE
PH UR STRIP: 7.5 [PH] (ref 5–7.5)
PLATELET # BLD AUTO: 133 10*3/MM3 (ref 140–450)
POTASSIUM SERPL-SCNC: 4.5 MMOL/L (ref 3.5–5.2)
PROT SERPL-MCNC: 6.9 G/DL (ref 6–8.5)
PROT UR QL STRIP: NORMAL
PSA SERPL-MCNC: 0.81 NG/ML (ref 0–4)
RBC # BLD AUTO: 5.35 10*6/MM3 (ref 4.14–5.8)
RBC #/AREA URNS HPF: NORMAL /HPF (ref 0–2)
SODIUM SERPL-SCNC: 137 MMOL/L (ref 136–145)
SP GR UR STRIP: 1.02 (ref 1–1.03)
TRIGL SERPL-MCNC: 122 MG/DL (ref 0–150)
URINALYSIS REFLEX: NORMAL
UROBILINOGEN UR STRIP-MCNC: 1 MG/DL (ref 0.2–1)
VLDLC SERPL CALC-MCNC: 22 MG/DL (ref 5–40)
WBC # BLD AUTO: 7.62 10*3/MM3 (ref 3.4–10.8)
WBC #/AREA URNS HPF: NORMAL /HPF (ref 0–5)

## 2023-05-30 NOTE — PROGRESS NOTES
All labs are improved from last visit. Liver enzymes are down. We did discuss limiting alcohol. Cholesterol is still elevated but stable. Limit fatty foods and increase exercise. Hgb alc is 5.7 which is pre diabetic range. Limit sugars and carbs. F/u in 3 months

## 2023-06-04 VITALS
BODY MASS INDEX: 43.63 KG/M2 | TEMPERATURE: 97.5 F | WEIGHT: 278 LBS | HEIGHT: 67 IN | DIASTOLIC BLOOD PRESSURE: 76 MMHG | HEART RATE: 70 BPM | SYSTOLIC BLOOD PRESSURE: 132 MMHG | OXYGEN SATURATION: 96 %

## 2024-03-11 ENCOUNTER — TELEPHONE (OUTPATIENT)
Dept: FAMILY MEDICINE CLINIC | Facility: CLINIC | Age: 49
End: 2024-03-11
Payer: COMMERCIAL

## 2024-03-11 DIAGNOSIS — I10 BENIGN ESSENTIAL HYPERTENSION: ICD-10-CM

## 2024-03-11 RX ORDER — LISINOPRIL 20 MG/1
20 TABLET ORAL 2 TIMES DAILY
Qty: 28 TABLET | Refills: 0 | Status: SHIPPED | OUTPATIENT
Start: 2024-03-11

## 2024-03-11 NOTE — TELEPHONE ENCOUNTER
Caller: Supa Kruse    Relationship: Self    Best call back number: [phone]     Requested Prescriptions:   Requested Prescriptions     Pending Prescriptions Disp Refills    lisinopril (PRINIVIL,ZESTRIL) 20 MG tablet 60 tablet 5     Sig: Take 1 tablet by mouth 2 (Two) Times a Day.        Pharmacy where request should be sent: [facility] [address] [place] [address] - [phone]  - [phone]      Last office visit with prescribing clinician: 5/26/2023   Last telemedicine visit with prescribing clinician: Visit date not found   Next office visit with prescribing clinician: 3/26/2024     Additional details provided by patient: PATIENT IS OUT OF MEDICATION    Does the patient have less than a 3 day supply:  [x] Yes  [] No    Would you like a call back once the refill request has been completed: [] Yes [x] No    If the office needs to give you a call back, can they leave a voicemail: [] Yes [x] No    Hien Flowers Rep   03/11/24 13:38 EDT

## 2024-04-10 ENCOUNTER — OFFICE VISIT (OUTPATIENT)
Dept: FAMILY MEDICINE CLINIC | Facility: CLINIC | Age: 49
End: 2024-04-10
Payer: COMMERCIAL

## 2024-04-10 VITALS
OXYGEN SATURATION: 97 % | BODY MASS INDEX: 43 KG/M2 | TEMPERATURE: 97 F | DIASTOLIC BLOOD PRESSURE: 86 MMHG | WEIGHT: 274 LBS | RESPIRATION RATE: 20 BRPM | HEIGHT: 67 IN | HEART RATE: 60 BPM | SYSTOLIC BLOOD PRESSURE: 142 MMHG

## 2024-04-10 DIAGNOSIS — I10 BENIGN ESSENTIAL HYPERTENSION: Primary | ICD-10-CM

## 2024-04-10 DIAGNOSIS — Z13.220 SCREENING FOR HYPERLIPIDEMIA: ICD-10-CM

## 2024-04-10 DIAGNOSIS — Z13.0 SCREENING FOR DEFICIENCY ANEMIA: ICD-10-CM

## 2024-04-10 DIAGNOSIS — R73.03 PRE-DIABETES: ICD-10-CM

## 2024-04-10 DIAGNOSIS — F41.9 ANXIETY: ICD-10-CM

## 2024-04-10 DIAGNOSIS — R74.8 ELEVATED LIVER ENZYMES: ICD-10-CM

## 2024-04-10 PROCEDURE — 99214 OFFICE O/P EST MOD 30 MIN: CPT | Performed by: NURSE PRACTITIONER

## 2024-04-10 RX ORDER — NALTREXONE HYDROCHLORIDE 50 MG/1
1 TABLET, FILM COATED ORAL DAILY
COMMUNITY
Start: 2024-03-28

## 2024-04-10 RX ORDER — LISINOPRIL 20 MG/1
20 TABLET ORAL 2 TIMES DAILY
Qty: 60 TABLET | Refills: 5 | Status: SHIPPED | OUTPATIENT
Start: 2024-04-10

## 2024-04-10 NOTE — PROGRESS NOTES
"Chief Complaint  Hypertension, Med Refill, and Annual Exam    Subjective        Supa Kruse presents to [facility]  History of Present Illness  Is a 48-year-old male patient here today for annual exam and follow-up on hypertension.  He is on lisinopril for hypertension and reports blood pressures are stable at home.  He denies any chest pain or shortness of breath.  He did lose his wife 10 years ago and does struggle with anxiety.  He does have episodes of drinking and has had elevated enzymes in the past.  Liver ultrasound showed fatty liver.  He has been offered counseling on drinking the past from his psychiatrist.  He does drink beer on the weekends and can drink up to 12-13 beers during that time.  There are times where he only drinks 4.  He states it depends on what he is doing and can vary each week.  He does have a girlfriend that lives in [place] who will come and visit.  His son is currently in college.  He does have hyperlipidemia and we have discussed cholesterol medicine in the past.        Objective   Vital Signs:  /86   Pulse 60   Temp 97 °F (36.1 °C)   Resp 20   Ht 170.2 cm (67.01\")   Wt 124 kg (274 lb)   SpO2 97%   BMI 42.90 kg/m²   Estimated body mass index is 42.9 kg/m² as calculated from the following:    Height as of this encounter: 170.2 cm (67.01\").    Weight as of this encounter: 124 kg (274 lb).       Class 3 Severe Obesity (BMI >=40). Obesity-related health conditions include the following: hypertension and dyslipidemias. Obesity is improving with lifestyle modifications. BMI is is above average; BMI management plan is completed. We discussed portion control and increasing exercise.      Physical Exam  Vitals and nursing note reviewed.   HENT:      Head: Normocephalic.      Nose: Nose normal.   Eyes:      Pupils: Pupils are equal, round, and reactive to light.   Cardiovascular:      Rate and Rhythm: Normal rate and regular rhythm.      Pulses: " Normal pulses.      Heart sounds: Normal heart sounds.   Pulmonary:      Effort: Pulmonary effort is normal. No respiratory distress.      Breath sounds: Normal breath sounds. No wheezing or rales.   Abdominal:      General: Bowel sounds are normal. There is no distension.      Tenderness: There is no abdominal tenderness.   Musculoskeletal:         General: No swelling.      Cervical back: Neck supple.      Right lower leg: No edema.      Left lower leg: No edema.   Skin:     General: Skin is warm and dry.   Neurological:      Mental Status: He is alert and oriented to person, place, and time.   Psychiatric:         Mood and Affect: Mood normal.        Result Review :                     Assessment and Plan     Diagnoses and all orders for this visit:    1. Benign essential hypertension (Primary)  -     Urinalysis With Culture If Indicated - Urine, Clean Catch  -     lisinopril (PRINIVIL,ZESTRIL) 20 MG tablet; Take 1 tablet by mouth 2 (Two) Times a Day.  Dispense: 60 tablet; Refill: 5    2. Screening for deficiency anemia  -     CBC & Differential    3. Elevated liver enzymes    4. Anxiety    5. Pre-diabetes  -     Hemoglobin A1c    6. Screening for hyperlipidemia  -     Comprehensive Metabolic Panel  -     Lipid Panel    Other orders  -     Microscopic Examination -    We will obtain blood work today.  He will continue his current medicine.  We did discuss his cardiovascular risk and he will consider statin if cholesterol is elevated  He will continue to work on lifestyle changes such as exercise and limiting alcohol.  He will follow-up with me in 6 months or sooner if needed         Follow Up     No follow-ups on file.  Patient was given instructions and counseling regarding his condition or for health maintenance advice. Please see specific information pulled into the AVS if appropriate.

## 2024-04-11 ENCOUNTER — PATIENT ROUNDING (BHMG ONLY) (OUTPATIENT)
Dept: FAMILY MEDICINE CLINIC | Facility: CLINIC | Age: 49
End: 2024-04-11
Payer: COMMERCIAL

## 2024-04-11 LAB
ALBUMIN SERPL-MCNC: 4.6 G/DL (ref 3.5–5.2)
ALBUMIN/GLOB SERPL: 1.9 G/DL
ALP SERPL-CCNC: 131 U/L (ref 39–117)
ALT SERPL-CCNC: 106 U/L (ref 1–41)
APPEARANCE UR: CLEAR
AST SERPL-CCNC: 60 U/L (ref 1–40)
BACTERIA #/AREA URNS HPF: NORMAL /[HPF]
BASOPHILS # BLD AUTO: 0.03 10*3/MM3 (ref 0–0.2)
BASOPHILS NFR BLD AUTO: 0.4 % (ref 0–1.5)
BILIRUB SERPL-MCNC: 0.5 MG/DL (ref 0–1.2)
BILIRUB UR QL STRIP: NEGATIVE
BUN SERPL-MCNC: 14 MG/DL (ref 6–20)
BUN/CREAT SERPL: 15.9 (ref 7–25)
CALCIUM SERPL-MCNC: 9.7 MG/DL (ref 8.6–10.5)
CASTS URNS QL MICRO: NORMAL /LPF
CHLORIDE SERPL-SCNC: 101 MMOL/L (ref 98–107)
CHOLEST SERPL-MCNC: 247 MG/DL (ref 0–200)
CO2 SERPL-SCNC: 25 MMOL/L (ref 22–29)
COLOR UR: YELLOW
CREAT SERPL-MCNC: 0.88 MG/DL (ref 0.76–1.27)
EGFRCR SERPLBLD CKD-EPI 2021: 106.7 ML/MIN/1.73
EOSINOPHIL # BLD AUTO: 0.26 10*3/MM3 (ref 0–0.4)
EOSINOPHIL NFR BLD AUTO: 3.4 % (ref 0.3–6.2)
EPI CELLS #/AREA URNS HPF: NORMAL /HPF (ref 0–10)
ERYTHROCYTE [DISTWIDTH] IN BLOOD BY AUTOMATED COUNT: 13 % (ref 12.3–15.4)
GLOBULIN SER CALC-MCNC: 2.4 GM/DL
GLUCOSE SERPL-MCNC: 107 MG/DL (ref 65–99)
GLUCOSE UR QL STRIP: NEGATIVE
HBA1C MFR BLD: 5.9 % (ref 4.8–5.6)
HCT VFR BLD AUTO: 48.6 % (ref 37.5–51)
HDLC SERPL-MCNC: 45 MG/DL (ref 40–60)
HGB BLD-MCNC: 15.8 G/DL (ref 13–17.7)
HGB UR QL STRIP: NEGATIVE
IMM GRANULOCYTES # BLD AUTO: 0.03 10*3/MM3 (ref 0–0.05)
IMM GRANULOCYTES NFR BLD AUTO: 0.4 % (ref 0–0.5)
KETONES UR QL STRIP: NEGATIVE
LDLC SERPL CALC-MCNC: 176 MG/DL (ref 0–100)
LEUKOCYTE ESTERASE UR QL STRIP: NEGATIVE
LYMPHOCYTES # BLD AUTO: 1.84 10*3/MM3 (ref 0.7–3.1)
LYMPHOCYTES NFR BLD AUTO: 24.1 % (ref 19.6–45.3)
MCH RBC QN AUTO: 29.2 PG (ref 26.6–33)
MCHC RBC AUTO-ENTMCNC: 32.5 G/DL (ref 31.5–35.7)
MCV RBC AUTO: 89.7 FL (ref 79–97)
MICRO URNS: NORMAL
MICRO URNS: NORMAL
MONOCYTES # BLD AUTO: 0.64 10*3/MM3 (ref 0.1–0.9)
MONOCYTES NFR BLD AUTO: 8.4 % (ref 5–12)
NEUTROPHILS # BLD AUTO: 4.85 10*3/MM3 (ref 1.7–7)
NEUTROPHILS NFR BLD AUTO: 63.3 % (ref 42.7–76)
NITRITE UR QL STRIP: NEGATIVE
NRBC BLD AUTO-RTO: 0 /100 WBC (ref 0–0.2)
PH UR STRIP: 5.5 [PH] (ref 5–7.5)
PLATELET # BLD AUTO: 134 10*3/MM3 (ref 140–450)
POTASSIUM SERPL-SCNC: 4.3 MMOL/L (ref 3.5–5.2)
PROT SERPL-MCNC: 7 G/DL (ref 6–8.5)
PROT UR QL STRIP: NEGATIVE
RBC # BLD AUTO: 5.42 10*6/MM3 (ref 4.14–5.8)
RBC #/AREA URNS HPF: NORMAL /HPF (ref 0–2)
SODIUM SERPL-SCNC: 138 MMOL/L (ref 136–145)
SP GR UR STRIP: 1.02 (ref 1–1.03)
TRIGL SERPL-MCNC: 144 MG/DL (ref 0–150)
URINALYSIS REFLEX: NORMAL
UROBILINOGEN UR STRIP-MCNC: 0.2 MG/DL (ref 0.2–1)
VLDLC SERPL CALC-MCNC: 26 MG/DL (ref 5–40)
WBC # BLD AUTO: 7.65 10*3/MM3 (ref 3.4–10.8)
WBC #/AREA URNS HPF: NORMAL /HPF (ref 0–5)

## 2024-04-11 RX ORDER — ATORVASTATIN CALCIUM 10 MG/1
10 TABLET, FILM COATED ORAL DAILY
Qty: 90 TABLET | Refills: 1 | Status: SHIPPED | OUTPATIENT
Start: 2024-04-11

## 2024-04-11 NOTE — PROGRESS NOTES
"My name is Gustavo Valdivia     I am the Practice Manager with   [facility] [address] [address] [phone]      I am messaging to ask you about our practice and your recent visit.     Tell me about your visit with us. What things went well?         We're always looking for ways to make our patients' experiences even better. Do you have recommendations on ways we may improve?       Overall were you satisfied with your first visit to our practice?        Is there anything else I can do for you?     Also, please note that you may receive a survey from \"[employer]\" please take time to fill that out, as it provides important feedback for our office.       Thank you, and have a great day.   "

## 2024-04-11 NOTE — PROGRESS NOTES
I spoke with patient regarding blood work.  He is working on his alcohol intake and is aware of his elevated liver enzymes.  We will start him on a low-dose statin.  He plans to come back in 6 months or sooner if needed.

## 2024-11-11 RX ORDER — ATORVASTATIN CALCIUM 10 MG/1
10 TABLET, FILM COATED ORAL DAILY
Qty: 30 TABLET | Refills: 0 | Status: SHIPPED | OUTPATIENT
Start: 2024-11-11

## 2024-12-26 DIAGNOSIS — I10 BENIGN ESSENTIAL HYPERTENSION: ICD-10-CM

## 2024-12-26 RX ORDER — LISINOPRIL 20 MG/1
20 TABLET ORAL 2 TIMES DAILY
Qty: 60 TABLET | Refills: 5 | Status: SHIPPED | OUTPATIENT
Start: 2024-12-26

## 2025-02-18 DIAGNOSIS — I10 BENIGN ESSENTIAL HYPERTENSION: ICD-10-CM

## 2025-02-18 RX ORDER — ATORVASTATIN CALCIUM 10 MG/1
10 TABLET, FILM COATED ORAL DAILY
Qty: 90 TABLET | Refills: 1 | Status: SHIPPED | OUTPATIENT
Start: 2025-02-18

## 2025-02-18 RX ORDER — LISINOPRIL 20 MG/1
20 TABLET ORAL 2 TIMES DAILY
Qty: 60 TABLET | Refills: 5 | Status: SHIPPED | OUTPATIENT
Start: 2025-02-18

## 2025-02-18 NOTE — TELEPHONE ENCOUNTER
Caller: Supa Kruse    Relationship: Self    Best call back number: [phone]     Which medication are you concerned about: lisinopril (PRINIVIL,ZESTRIL) 20 MG tablet     Who prescribed you this medication: KENNEY CESILIA    What are your concerns: PATIENT IS CALLING FOR A NEW PRESCRIPTION WITH REFILLS FOR THE ABOVE MEDICATION.  HE STATES HE DID NOT GET THE PRESCRIPTION THAT WAS SENT TO THE PHARMACY IN DECEMBER, DUE TO HAVING A FEW TABLETS ON HAND.  PHARMACY TOLD HIM THAT HE HAS NO REFILLS.           [facility] [place] - [address] [facility] [address] - [phone]  - [phone]  [phone]     PLEASE ADVISE.

## 2025-06-09 DIAGNOSIS — I10 BENIGN ESSENTIAL HYPERTENSION: ICD-10-CM

## 2025-06-09 NOTE — TELEPHONE ENCOUNTER
Caller: Supa Kruse    Relationship: Self    Best call back number: [phone]    Requested Prescriptions:   Requested Prescriptions     Pending Prescriptions Disp Refills    lisinopril (PRINIVIL,ZESTRIL) 20 MG tablet 60 tablet 5     Sig: Take 1 tablet by mouth 2 (Two) Times a Day.    atorvastatin (LIPITOR) 10 MG tablet 90 tablet 1     Sig: Take 1 tablet by mouth Daily.      Pharmacy where request should be sent: [facility] [address] - [phone]  - [phone]      Last office visit with prescribing clinician: 4/10/2024   Last telemedicine visit with prescribing clinician: Visit date not found   Next office visit with prescribing clinician: 6/19/2025     Does the patient have less than a 3 day supply:  [x] Yes  [] No    Hien Parada Rep   06/09/25 15:54 EDT

## 2025-06-10 RX ORDER — ATORVASTATIN CALCIUM 10 MG/1
10 TABLET, FILM COATED ORAL DAILY
Qty: 90 TABLET | Refills: 1 | Status: SHIPPED | OUTPATIENT
Start: 2025-06-10

## 2025-06-10 RX ORDER — LISINOPRIL 20 MG/1
20 TABLET ORAL 2 TIMES DAILY
Qty: 60 TABLET | Refills: 5 | Status: SHIPPED | OUTPATIENT
Start: 2025-06-10

## 2025-07-22 ENCOUNTER — TELEPHONE (OUTPATIENT)
Dept: FAMILY MEDICINE CLINIC | Facility: CLINIC | Age: 50
End: 2025-07-22

## 2025-08-06 ENCOUNTER — OFFICE VISIT (OUTPATIENT)
Dept: FAMILY MEDICINE CLINIC | Facility: CLINIC | Age: 50
End: 2025-08-06
Payer: COMMERCIAL

## 2025-08-06 VITALS
DIASTOLIC BLOOD PRESSURE: 78 MMHG | TEMPERATURE: 98.2 F | SYSTOLIC BLOOD PRESSURE: 132 MMHG | HEIGHT: 67 IN | RESPIRATION RATE: 16 BRPM | OXYGEN SATURATION: 97 % | BODY MASS INDEX: 41.28 KG/M2 | WEIGHT: 263 LBS | HEART RATE: 85 BPM

## 2025-08-06 DIAGNOSIS — Z80.42 FAMILY HISTORY OF PROSTATE CANCER: ICD-10-CM

## 2025-08-06 DIAGNOSIS — R73.03 PRE-DIABETES: ICD-10-CM

## 2025-08-06 DIAGNOSIS — Z00.00 ROUTINE ADULT HEALTH MAINTENANCE: ICD-10-CM

## 2025-08-06 DIAGNOSIS — I10 BENIGN ESSENTIAL HYPERTENSION: ICD-10-CM

## 2025-08-06 DIAGNOSIS — Z13.220 SCREENING FOR HYPERLIPIDEMIA: ICD-10-CM

## 2025-08-06 DIAGNOSIS — Z12.11 COLON CANCER SCREENING: ICD-10-CM

## 2025-08-06 DIAGNOSIS — R74.8 ELEVATED LIVER ENZYMES: Primary | ICD-10-CM

## 2025-08-06 DIAGNOSIS — Z13.0 SCREENING FOR DEFICIENCY ANEMIA: ICD-10-CM

## 2025-08-07 LAB
ALBUMIN SERPL-MCNC: 4.9 G/DL (ref 3.5–5.2)
ALBUMIN/GLOB SERPL: 1.6 G/DL
ALP SERPL-CCNC: 135 U/L (ref 39–117)
ALT SERPL-CCNC: 107 U/L (ref 1–41)
APPEARANCE UR: CLEAR
AST SERPL-CCNC: 65 U/L (ref 1–40)
BACTERIA #/AREA URNS HPF: NORMAL /[HPF]
BASOPHILS # BLD AUTO: 0.04 10*3/MM3 (ref 0–0.2)
BASOPHILS NFR BLD AUTO: 0.4 % (ref 0–1.5)
BILIRUB SERPL-MCNC: 0.5 MG/DL (ref 0–1.2)
BILIRUB UR QL STRIP: NEGATIVE
BUN SERPL-MCNC: 14 MG/DL (ref 6–20)
BUN/CREAT SERPL: 15.1 (ref 7–25)
CALCIUM SERPL-MCNC: 10.5 MG/DL (ref 8.6–10.5)
CASTS URNS QL MICRO: NORMAL /LPF
CHLORIDE SERPL-SCNC: 97 MMOL/L (ref 98–107)
CHOLEST SERPL-MCNC: 198 MG/DL (ref 0–200)
CO2 SERPL-SCNC: 24.1 MMOL/L (ref 22–29)
COLOR UR: YELLOW
CREAT SERPL-MCNC: 0.93 MG/DL (ref 0.76–1.27)
EGFRCR SERPLBLD CKD-EPI 2021: 101.3 ML/MIN/1.73
EOSINOPHIL # BLD AUTO: 0.22 10*3/MM3 (ref 0–0.4)
EOSINOPHIL NFR BLD AUTO: 2.4 % (ref 0.3–6.2)
EPI CELLS #/AREA URNS HPF: NORMAL /HPF (ref 0–10)
ERYTHROCYTE [DISTWIDTH] IN BLOOD BY AUTOMATED COUNT: 13.7 % (ref 12.3–15.4)
GLOBULIN SER CALC-MCNC: 3 GM/DL
GLUCOSE SERPL-MCNC: 114 MG/DL (ref 65–99)
GLUCOSE UR QL STRIP: NEGATIVE
HBA1C MFR BLD: 6 % (ref 4.8–5.6)
HCT VFR BLD AUTO: 49.1 % (ref 37.5–51)
HDLC SERPL-MCNC: 49 MG/DL (ref 40–60)
HGB BLD-MCNC: 16.4 G/DL (ref 13–17.7)
HGB UR QL STRIP: NEGATIVE
IMM GRANULOCYTES # BLD AUTO: 0.03 10*3/MM3 (ref 0–0.05)
IMM GRANULOCYTES NFR BLD AUTO: 0.3 % (ref 0–0.5)
KETONES UR QL STRIP: NEGATIVE
LDLC SERPL CALC-MCNC: 130 MG/DL (ref 0–100)
LEUKOCYTE ESTERASE UR QL STRIP: NEGATIVE
LYMPHOCYTES # BLD AUTO: 2.03 10*3/MM3 (ref 0.7–3.1)
LYMPHOCYTES NFR BLD AUTO: 22.5 % (ref 19.6–45.3)
MCH RBC QN AUTO: 30.4 PG (ref 26.6–33)
MCHC RBC AUTO-ENTMCNC: 33.4 G/DL (ref 31.5–35.7)
MCV RBC AUTO: 91.1 FL (ref 79–97)
MICRO URNS: NORMAL
MICRO URNS: NORMAL
MONOCYTES # BLD AUTO: 0.76 10*3/MM3 (ref 0.1–0.9)
MONOCYTES NFR BLD AUTO: 8.4 % (ref 5–12)
NEUTROPHILS # BLD AUTO: 5.94 10*3/MM3 (ref 1.7–7)
NEUTROPHILS NFR BLD AUTO: 66 % (ref 42.7–76)
NITRITE UR QL STRIP: NEGATIVE
NRBC BLD AUTO-RTO: 0 /100 WBC (ref 0–0.2)
PH UR STRIP: 5.5 [PH] (ref 5–7.5)
PLATELET # BLD AUTO: 165 10*3/MM3 (ref 140–450)
POTASSIUM SERPL-SCNC: 5 MMOL/L (ref 3.5–5.2)
PROT SERPL-MCNC: 7.9 G/DL (ref 6–8.5)
PROT UR QL STRIP: NEGATIVE
RBC # BLD AUTO: 5.39 10*6/MM3 (ref 4.14–5.8)
RBC #/AREA URNS HPF: NORMAL /HPF (ref 0–2)
SODIUM SERPL-SCNC: 137 MMOL/L (ref 136–145)
SP GR UR STRIP: 1.02 (ref 1–1.03)
TRIGL SERPL-MCNC: 104 MG/DL (ref 0–150)
URINALYSIS REFLEX: NORMAL
UROBILINOGEN UR STRIP-MCNC: 0.2 MG/DL (ref 0.2–1)
VLDLC SERPL CALC-MCNC: 19 MG/DL (ref 5–40)
WBC # BLD AUTO: 9.02 10*3/MM3 (ref 3.4–10.8)
WBC #/AREA URNS HPF: NORMAL /HPF (ref 0–5)